# Patient Record
Sex: MALE | Race: WHITE | NOT HISPANIC OR LATINO
[De-identification: names, ages, dates, MRNs, and addresses within clinical notes are randomized per-mention and may not be internally consistent; named-entity substitution may affect disease eponyms.]

---

## 2018-03-16 ENCOUNTER — RESULT REVIEW (OUTPATIENT)
Age: 53
End: 2018-03-16

## 2018-11-07 PROBLEM — Z00.00 ENCOUNTER FOR PREVENTIVE HEALTH EXAMINATION: Status: ACTIVE | Noted: 2018-11-07

## 2018-11-08 ENCOUNTER — APPOINTMENT (OUTPATIENT)
Dept: CT IMAGING | Facility: CLINIC | Age: 53
End: 2018-11-08
Payer: COMMERCIAL

## 2018-11-08 ENCOUNTER — OUTPATIENT (OUTPATIENT)
Dept: OUTPATIENT SERVICES | Facility: HOSPITAL | Age: 53
LOS: 1 days | End: 2018-11-08
Payer: COMMERCIAL

## 2018-11-08 DIAGNOSIS — Z00.8 ENCOUNTER FOR OTHER GENERAL EXAMINATION: ICD-10-CM

## 2018-11-08 PROCEDURE — 70486 CT MAXILLOFACIAL W/O DYE: CPT

## 2018-11-08 PROCEDURE — 70486 CT MAXILLOFACIAL W/O DYE: CPT | Mod: 26

## 2020-01-27 ENCOUNTER — NON-APPOINTMENT (OUTPATIENT)
Age: 55
End: 2020-01-27

## 2020-01-27 ENCOUNTER — APPOINTMENT (OUTPATIENT)
Dept: PULMONOLOGY | Facility: CLINIC | Age: 55
End: 2020-01-27

## 2020-01-27 ENCOUNTER — APPOINTMENT (OUTPATIENT)
Dept: PULMONOLOGY | Facility: CLINIC | Age: 55
End: 2020-01-27
Payer: COMMERCIAL

## 2020-01-27 VITALS
BODY MASS INDEX: 28.49 KG/M2 | RESPIRATION RATE: 16 BRPM | SYSTOLIC BLOOD PRESSURE: 120 MMHG | OXYGEN SATURATION: 96 % | WEIGHT: 188 LBS | HEIGHT: 68 IN | DIASTOLIC BLOOD PRESSURE: 80 MMHG | HEART RATE: 83 BPM

## 2020-01-27 DIAGNOSIS — J82 PULMONARY EOSINOPHILIA, NOT ELSEWHERE CLASSIFIED: ICD-10-CM

## 2020-01-27 DIAGNOSIS — Z78.9 OTHER SPECIFIED HEALTH STATUS: ICD-10-CM

## 2020-01-27 DIAGNOSIS — Z86.19 PERSONAL HISTORY OF OTHER INFECTIOUS AND PARASITIC DISEASES: ICD-10-CM

## 2020-01-27 PROCEDURE — 95012 NITRIC OXIDE EXP GAS DETER: CPT

## 2020-01-27 PROCEDURE — 94618 PULMONARY STRESS TESTING: CPT

## 2020-01-27 PROCEDURE — 99204 OFFICE O/P NEW MOD 45 MIN: CPT | Mod: 25

## 2020-01-27 PROCEDURE — 71046 X-RAY EXAM CHEST 2 VIEWS: CPT

## 2020-01-27 PROCEDURE — 94060 EVALUATION OF WHEEZING: CPT

## 2020-01-27 RX ORDER — BUDESONIDE 0.5 MG/2ML
0.5 INHALANT ORAL
Qty: 60 | Refills: 5 | Status: ACTIVE | COMMUNITY
Start: 2020-01-27 | End: 1900-01-01

## 2020-01-27 RX ORDER — ALBUTEROL SULFATE 90 UG/1
108 (90 BASE) AEROSOL, METERED RESPIRATORY (INHALATION)
Refills: 0 | Status: ACTIVE | COMMUNITY

## 2020-01-27 RX ORDER — CICLESONIDE 160 UG/1
160 AEROSOL, METERED RESPIRATORY (INHALATION) TWICE DAILY
Qty: 3 | Refills: 1 | Status: ACTIVE | COMMUNITY
Start: 2020-01-27 | End: 1900-01-01

## 2020-01-27 NOTE — PHYSICAL EXAM
[No Acute Distress] : no acute distress [Normal Oropharynx] : normal oropharynx [III] : Mallampati Class: III [Normal Appearance] : normal appearance [No Neck Mass] : no neck mass [Normal Rate/Rhythm] : normal rate/rhythm [Normal S1, S2] : normal s1, s2 [No Murmurs] : no murmurs [No Resp Distress] : no resp distress [Clear to Auscultation Bilaterally] : clear to auscultation bilaterally [No Abnormalities] : no abnormalities [Benign] : benign [Normal Gait] : normal gait [No Clubbing] : no clubbing [No Cyanosis] : no cyanosis [No Edema] : no edema [FROM] : FROM [Normal Color/ Pigmentation] : normal color/ pigmentation [No Focal Deficits] : no focal deficits [Oriented x3] : oriented x3 [Normal Affect] : normal affect [TextBox_68] : I:E 1:3, expiratory wheezes

## 2020-01-27 NOTE — ADDENDUM
[FreeTextEntry1] : Documented by Zan Begum acting as a scribe for Dr. Arpit Lindquist on 01/27/2020 \par \par All medical record entries made by the Scribe were at my, Dr. Arpit Lindquist's, direction and personally dictated by me on 01/27/2020 . I have reviewed the chart and agree that the record accurately reflects my personal performance of the history, physical exam, assessment and plan. I have also personally directed, reviewed, and agree with the discharge instructions.

## 2020-01-27 NOTE — HISTORY OF PRESENT ILLNESS
[TextBox_4] : Mr. LASHAUN ABERNATHY is a 54 year year old male coming into the office for an initial evaluation. His chief complaint is\par -Every few months, he gets a sinus infection and needs the use of prednisone. \par -he states that he gets excess mucus production, sinus headache.\par -he states that the prednisone clears himself. \par -he reports that he had chest tightness. \par -he states that he gets a aluminum or steel smell/taste. \par -he notes that he used to have heartburn but after losing weight its resolved. \par -he notes that he gets a lump in his throat that needs to be clears. \par -he states that he has a PND. \par -he states that he snores. \par -he wakes up rested after a night of sleep. \par -he notes being fatigue at times. \par -He reports being unable to fall asleep while watching a boring TV show. \par -he notes that his memory and concentration is good. \par -he states that his neck size is 16. \par -He denies any headaches, nausea, vomiting, fever, chills, sweats, chest pain, chest pressure, palpitations, SOB, coughing, wheezing, fatigue, diarrhea, constipation, dysphagia, myalgias, dizziness, leg swelling, leg pain, itchy eyes, itchy ears, heartburn, reflux, or sour taste in the mouth.

## 2020-01-27 NOTE — PROCEDURE
[FreeTextEntry1] : FENO was 154; a normal value being less than 25\par Fractional exhaled nitric oxide (FENO) is regarded as a simple, noninvasive method for assessing eosinophilic airway inflammation. Produced by a variety of cells within the lung, nitric oxide (NO) concentrations are generally low in healthy individuals. However, high concentrations of NO appear to be involved in nonspecific host defense mechanisms and chronic inflammatory diseases such as asthma. The American Thoracic Society (ATS) therefore has recommended using FENO to aid in the diagnosis and monitoring of eosinophilic airway inflammation and asthma, and for identifying steroid responsive individuals whose chronic respiratory symptoms may be caused by airway inflammation. \par \par PFT reveals normal flows, with an FEV1 of  2.99L, which is 83% of predicted, with a normal flow volume loop with a 11% improvement with bronchodilator\par \par CXR revealed a normal sized heart; there was no evidence of infiltrate or effusion-- A normal chest radiograph. \par \par 6 minute walk test reveals a low saturation of 95% with somewhat dyspnea; walked 652.0 meters

## 2020-01-27 NOTE — ASSESSMENT
[FreeTextEntry1] : Mr. LASHAUN ABERNATHY is a 54 year year old male who has a history of chicken pox, chronic sinus disease, sarcoidoses, Cornelio's syndrome, Asthma, GERD, Nasal and sinus Polyps, who now comes in for pulmonary evaluation. \par \par The patient's SOB is felt to be multifactorial:\par -pulmonary \par    -Asthma ; less likely from sarcoidosis (inactive)  \par - Poor breathing mechanics\par - Overweight/ Out-of Shape\par - Restrictive Dysfunction or Obstructive Dysfunction\par - ?Cardiac Disease \par \par Problem 1:Asthma \par -Candidate of Biological therapy  \par prior IGE levels, had been on Xolair \par -Complete blood work for asthma profile, food IgE panel, eosinophil level, IgE level, Vitamin D level after 2-3 weeks of finishing prednisone course \par -Add Bevespi 2 inhalations BID\par -Add Alvesco (160) 2 inhalations BID \par -Add Zyflo 400 mg BID\par -Add Albuterol QID via Nebulizer \par -Asthma is believed to be caused by inherited (genetic) and environmental factor, but its exact cause is unknown. Asthma may be triggered by allergens, lung infections, or irritants in the air. Asthma triggers are different for each person \par  \par Problem 2: Sarcoidosis\par -currently inactive  \par -at this moment, no need for any therapy \par \par Problem 3:GERD \par - Things to avoid including overeating, spicy foods, tight clothing, eating within three hours of bed, this list is not all inclusive.\par \par - For treatment of reflux, possible options discussed including diet control, H2 blockers, PPIs, as well as coating motility agents discussed as treatment options. Timing of meals and proximity of last meal to sleep were discussed. If symptoms persist, a formal gastrointestinal evaluation is needed. \par \par Problem 4:Chronic Sinus Disease/ Nasal Polyps \par -Recommended the use of Navage Rinsing system. Pulmicort .5  in the Navage \par -considering Dupixent \par \par Problem 5:NAIDA\par -complete home sleep study \par - Due to his / her EDS, fatigue, snoring, elevated Mallampati class, poor memory, poor concentration, and neck size, he / she is being recommended for a home sleep study. \par - Sleep apnea is associated with adverse clinical consequences which an affect most organ systems. Cardiovascular disease risk includes arrhythmias, atrial fibrillation, hypertension, coronary artery disease, and stroke. Metabolic disorders include diabetes type 2, non-alcoholic fatty liver disease. Mood disorder especially depression; and cognitive decline especially in the elderly. Associations with chronic reflux/Akhtar’s esophagus some but not all inclusive. \par -Reasons include arousal consistent with hypopnea; respiratory events most prominent in REM sleep or supine position; therefore sleep staging and body position are important for accurate diagnosis and estimation of AHI. \par \par Problem 6: Overweight\par - Weight loss, exercise, and diet control were discussed and are highly encouraged. Treatment options were given such as, aqua therapy, and contacting a nutritionist. Recommended to use the elliptical, stationary bike, less use of treadmill. Mindful eating was explained to the patient Obesity is associated with worsening asthma, shortness of breath, and potential for cardiac disease, diabetes, and other underlying medical conditions.\par \par Problem 7: Poor Breathing Mechanics\par - Proper breathing techniques were reviewed with an emphasis of exhalation. Patient instructed to breath in for 1 second and out for four seconds. Patient was encouraged to not talk while walking.\par \par Problem 8: Health maintenance\par -s/p flu shot\par -recommended strep pneumonia vaccines: Prevnar-13 vaccine, followed by Pneumo vaccine 23 one year following\par -recommended early intervention for URIs\par -recommended regular osteoporosis evaluations\par -recommended early dermatological evaluations\par -recommended after the age of 50 to the age of 70, colonoscopy every 5 years\par \par f/u in 6-8 weeks\par pt is encouraged to call or fax the office with any questions or concerns.\par Explained to the pt in full detail with demonstrations how to use the inhalers and inhaler hygiene.\par -Education provided to the PT regarding their visit and conditions.

## 2020-03-05 ENCOUNTER — LABORATORY RESULT (OUTPATIENT)
Age: 55
End: 2020-03-05

## 2020-03-05 LAB
25(OH)D3 SERPL-MCNC: 25.4 NG/ML
BASOPHILS # BLD AUTO: 0.04 K/UL
BASOPHILS NFR BLD AUTO: 0.7 %
EOSINOPHIL # BLD AUTO: 0.66 K/UL
EOSINOPHIL NFR BLD AUTO: 10.8 %
HCT VFR BLD CALC: 47.3 %
HGB BLD-MCNC: 14.8 G/DL
IMM GRANULOCYTES NFR BLD AUTO: 0.2 %
LYMPHOCYTES # BLD AUTO: 1.83 K/UL
LYMPHOCYTES NFR BLD AUTO: 30 %
MAN DIFF?: NORMAL
MCHC RBC-ENTMCNC: 26 PG
MCHC RBC-ENTMCNC: 31.3 GM/DL
MCV RBC AUTO: 83.1 FL
MONOCYTES # BLD AUTO: 0.56 K/UL
MONOCYTES NFR BLD AUTO: 9.2 %
NEUTROPHILS # BLD AUTO: 3.01 K/UL
NEUTROPHILS NFR BLD AUTO: 49.1 %
PLATELET # BLD AUTO: 256 K/UL
RBC # BLD: 5.69 M/UL
RBC # FLD: 14.9 %
WBC # FLD AUTO: 6.11 K/UL

## 2020-03-06 LAB — 24R-OH-CALCIDIOL SERPL-MCNC: 38.1 PG/ML

## 2020-03-08 LAB — TOTAL IGE SMQN RAST: 125 KU/L

## 2020-03-10 ENCOUNTER — APPOINTMENT (OUTPATIENT)
Dept: PULMONOLOGY | Facility: CLINIC | Age: 55
End: 2020-03-10
Payer: COMMERCIAL

## 2020-03-10 VITALS
HEART RATE: 85 BPM | DIASTOLIC BLOOD PRESSURE: 78 MMHG | HEIGHT: 68 IN | BODY MASS INDEX: 28.49 KG/M2 | RESPIRATION RATE: 17 BRPM | SYSTOLIC BLOOD PRESSURE: 110 MMHG | OXYGEN SATURATION: 95 % | WEIGHT: 188 LBS

## 2020-03-10 LAB
A ALTERNATA IGE QN: <0.1 KUA/L
A FUMIGATUS IGE QN: <0.1 KUA/L
C ALBICANS IGE QN: <0.1 KUA/L
C HERBARUM IGE QN: <0.1 KUA/L
CAT DANDER IGE QN: <0.1 KUA/L
CLAM IGE QN: <0.1 KUA/L
CODFISH IGE QN: <0.1 KUA/L
COMMON RAGWEED IGE QN: 0.12 KUA/L
CORN IGE QN: <0.1 KUA/L
COW MILK IGE QN: 0.15 KUA/L
D FARINAE IGE QN: <0.1 KUA/L
D PTERONYSS IGE QN: <0.1 KUA/L
DEPRECATED A ALTERNATA IGE RAST QL: 0
DEPRECATED A FUMIGATUS IGE RAST QL: 0
DEPRECATED C ALBICANS IGE RAST QL: 0
DEPRECATED C HERBARUM IGE RAST QL: 0
DEPRECATED CAT DANDER IGE RAST QL: 0
DEPRECATED CLAM IGE RAST QL: 0
DEPRECATED CODFISH IGE RAST QL: 0
DEPRECATED COMMON RAGWEED IGE RAST QL: NORMAL
DEPRECATED CORN IGE RAST QL: 0
DEPRECATED COW MILK IGE RAST QL: NORMAL
DEPRECATED D FARINAE IGE RAST QL: 0
DEPRECATED D PTERONYSS IGE RAST QL: 0
DEPRECATED DOG DANDER IGE RAST QL: 0
DEPRECATED EGG WHITE IGE RAST QL: NORMAL
DEPRECATED M RACEMOSUS IGE RAST QL: 0
DEPRECATED PEANUT IGE RAST QL: NORMAL
DEPRECATED ROACH IGE RAST QL: 0
DEPRECATED SCALLOP IGE RAST QL: <0.1 KUA/L
DEPRECATED SESAME SEED IGE RAST QL: NORMAL
DEPRECATED SHRIMP IGE RAST QL: 0
DEPRECATED SOYBEAN IGE RAST QL: 0
DEPRECATED TIMOTHY IGE RAST QL: 0
DEPRECATED WALNUT IGE RAST QL: 0
DEPRECATED WHEAT IGE RAST QL: NORMAL
DEPRECATED WHITE OAK IGE RAST QL: 0
DOG DANDER IGE QN: <0.1 KUA/L
EGG WHITE IGE QN: 0.33 KUA/L
M RACEMOSUS IGE QN: <0.1 KUA/L
PEANUT IGE QN: 0.12 KUA/L
ROACH IGE QN: <0.1 KUA/L
SCALLOP IGE QN: 0
SCALLOP IGE QN: <0.1 KUA/L
SESAME SEED IGE QN: 0.12 KUA/L
SOYBEAN IGE QN: <0.1 KUA/L
TIMOTHY IGE QN: <0.1 KUA/L
WALNUT IGE QN: <0.1 KUA/L
WHEAT IGE QN: 0.32 KUA/L
WHITE OAK IGE QN: <0.1 KUA/L

## 2020-03-10 PROCEDURE — 99214 OFFICE O/P EST MOD 30 MIN: CPT | Mod: 25

## 2020-03-10 NOTE — ASSESSMENT
[FreeTextEntry1] : Mr. LASHAUN ABERNATHY is a 54 year year old male who has a history of chicken pox, chronic sinus disease, sarcoidoses, Cornelio's syndrome, severe persistent Asthma, GERD, Nasal and sinus Polyps, who now comes in for pulmonary evaluation. \par \par The patient's SOB is felt to be multifactorial:\par -pulmonary \par    -Asthma ; less likely from sarcoidosis (inactive)  \par - Poor breathing mechanics\par - Overweight/ Out-of Shape\par - Restrictive Dysfunction or Obstructive Dysfunction\par - ?Cardiac Disease \par \par Problem 1: Severe Persistent Asthma - poorly controlled \par -Candidate of Biological therapy  \par prior IGE levels, had been on Xolair \par - s/p blood work for asthma profile (+), food IgE panel (+), eosinophil level (+), IgE level (+), Vitamin D level (low) \par -continue Bevespi 2 inhalations BID\par -continue Alvesco (160) 2 inhalations BID \par -Add QVar 2 inhalations BID\par -continue Zyflo 600 mg BID\par -continue Albuterol QID via Nebulizer \par -Asthma is believed to be caused by inherited (genetic) and environmental factor, but its exact cause is unknown. Asthma may be triggered by allergens, lung infections, or irritants in the air. Asthma triggers are different for each person \par  \par Problem 1A: Eosinophilic  Asthma \par - Fasenra, Nucala, Dupixent Candidate \par Dupixent is a prescription medicine used with other asthma medicines for the maintenance treatment of moderate-to-severe asthma in people aged 12 years and older whose asthma is not controlled with their current asthma medicines. Dupixent helps prevent severe asthma attacks (exacerbations) and can improve your breathing. Dupixent may also help reduce the amount of oral corticosteroids you need while preventing severe asthma attacks and improving your breathing. Dupixent is not used to treat sudden breathing problems. Risks and side effect of Dupixent were discussed and reviewed with patient.\par \par \par Problem 1B: elevated IgE (125)\par - Candidate for Xolair \par -Xolair is a recombinant DNA- derived humanized IgG1K monoclonal antibody that selectively binds to human immunoglobulin E (IgE). Xolair is produced by a Chinese hamster ovary cell suspension culture in nutrient medium containing the antibiotic gentamicin. Gentamicin is not detectable in the final product. Xolair is a sterile, white, preservative free, lyophilized powder contained in a single use vial that is reconstituted with sterile water for suspension. Side effects include: wheezing, tightness of the chest, trouble breathing, hives, skin rash, feeling anxious or light-headed, fainting, warmth or tingling under skin, or swelling of face, lips, or tongue \par \par Problem 2: Sarcoidosis\par -currently inactive  \par -at this moment, no need for any therapy \par -Sarcoidosis is a medical mystery and can present with very serious illness or little consequence. The disease can affect any organ including skin, liver, lymph glands, spleen, eyes, nervous system, musculoskeletal system, heart, brain, kidneys, and including the lungs. Pulmonary sarcoidosis can cause loss of lung volume and abnormal lung stiffness. This disease is characterized by presence of granulomas, small areas of inflamed cells. Sarcoidosis patients should have regular cardiac and eye examinations as well as regular PFTs. \par \par Problem 2A: Low Vit D\par - On Vit D supplements \par Has been associated with asthma exacerbations and increased allergic symptoms. The goal based on recent information is maintaining levels between 50-70 and low normal is 30. Recommended 50,000 units every two weeks to once a month depending on the level.\par \par \par Problem 3:GERD \par - Things to avoid including overeating, spicy foods, tight clothing, eating within three hours of bed, this list is not all inclusive.\par \par - For treatment of reflux, possible options discussed including diet control, H2 blockers, PPIs, as well as coating motility agents discussed as treatment options. Timing of meals and proximity of last meal to sleep were discussed. If symptoms persist, a formal gastrointestinal evaluation is needed. \par \par Problem 4:Chronic Sinus Disease/ Nasal Polyps \par \par -Recommended the use of Navage Rinsing system. Pulmicort .5  in the Navage \par -Dupixent 200 q2wh\par \par Problem 5:NAIDA\par -complete home sleep study \par - Due to his / her EDS, fatigue, snoring, elevated Mallampati class, poor memory, poor concentration, and neck size, he / she is being recommended for a home sleep study. \par - Sleep apnea is associated with adverse clinical consequences which an affect most organ systems. Cardiovascular disease risk includes arrhythmias, atrial fibrillation, hypertension, coronary artery disease, and stroke. Metabolic disorders include diabetes type 2, non-alcoholic fatty liver disease. Mood disorder especially depression; and cognitive decline especially in the elderly. Associations with chronic reflux/Akhtar’s esophagus some but not all inclusive. \par -Reasons include arousal consistent with hypopnea; respiratory events most prominent in REM sleep or supine position; therefore sleep staging and body position are important for accurate diagnosis and estimation of AHI. \par \par Problem 6: Overweight\par - Weight loss, exercise, and diet control were discussed and are highly encouraged. Treatment options were given such as, aqua therapy, and contacting a nutritionist. Recommended to use the elliptical, stationary bike, less use of treadmill. Mindful eating was explained to the patient Obesity is associated with worsening asthma, shortness of breath, and potential for cardiac disease, diabetes, and other underlying medical conditions.\par \par Problem 7: Poor Breathing Mechanics\par - Proper breathing techniques were reviewed with an emphasis of exhalation. Patient instructed to breath in for 1 second and out for four seconds. Patient was encouraged to not talk while walking.\par \par Problem 8: Health maintenance\par -s/p flu shot\par -recommended strep pneumonia vaccines: Prevnar-13 vaccine, followed by Pneumo vaccine 23 one year following\par -recommended early intervention for URIs\par -recommended regular osteoporosis evaluations\par -recommended early dermatological evaluations\par -recommended after the age of 50 to the age of 70, colonoscopy every 5 years\par \par f/u in 6-8 weeks\par pt is encouraged to call or fax the office with any questions or concerns.\par Explained to the pt in full detail with demonstrations how to use the inhalers and inhaler hygiene.\par -Education provided to the PT regarding their visit and conditions.

## 2020-03-10 NOTE — HISTORY OF PRESENT ILLNESS
[TextBox_4] : Mr. LASHAUN ABERNATHY is a 54 year year old male with hx of severe persistent asthma, sarcoidosis, nasal polyposis, allergic rhinitis, coming into the office for an initial evaluation. His chief complaint is\par - he notes he has not been feeling well for about 10 days. \par - He notes he has been losing weight \par - he notes hes been bringing up clear phlegm \par - His energy levels have been about 7/10 \par - he has been having trouble with sleep, hes been coughing and its been worse when he lays down\par - He has been using his inhalers properly \par - he notes he has been having some chest pressure \par - he notes his sinuses have been bothering him. \par -he denies any headaches, nausea, vomiting, fever, chills, sweats, chest pain,, diarrhea, constipation, dysphagia, dizziness, sour taste in the mouth, leg swelling, leg pain, itchy eyes, itchy ears, heartburn, reflux, myalgias or arthralgias.

## 2020-03-10 NOTE — PHYSICAL EXAM
[No Acute Distress] : no acute distress [Normal Oropharynx] : normal oropharynx [III] : Mallampati Class: III [Normal Appearance] : normal appearance [No Neck Mass] : no neck mass [Normal Rate/Rhythm] : normal rate/rhythm [Normal S1, S2] : normal s1, s2 [No Murmurs] : no murmurs [No Resp Distress] : no resp distress [Clear to Auscultation Bilaterally] : clear to auscultation bilaterally [No Abnormalities] : no abnormalities [Benign] : benign [Normal Gait] : normal gait [No Clubbing] : no clubbing [No Cyanosis] : no cyanosis [No Edema] : no edema [FROM] : FROM [Normal Color/ Pigmentation] : normal color/ pigmentation [No Focal Deficits] : no focal deficits [Oriented x3] : oriented x3 [Normal Affect] : normal affect [TextBox_68] : I:E 1:3, expiratory wheezes b/l

## 2020-03-10 NOTE — REASON FOR VISIT
[Initial] : an initial visit [TextBox_44] : severe persistent asthma, sarcoidosis, nasal polyposis, allergic rhinitis

## 2020-03-10 NOTE — ADDENDUM
[FreeTextEntry1] : Documented by Carissa Wilson acting as a scribe for Dr. Arpit Lindquist on 03/10/2020.\par \par All medical record entries made by the Scribe were at my, Dr. Arpit Lindquist's, direction and personally dictated by me on 03/10/2020. I have reviewed the chart and agree that the record accurately reflects my personal performance of the history, physical exam, assessment and plan. I have also personally directed, reviewed, and agree with the discharge instructions.\par

## 2020-06-14 ENCOUNTER — RX RENEWAL (OUTPATIENT)
Age: 55
End: 2020-06-14

## 2020-06-22 ENCOUNTER — APPOINTMENT (OUTPATIENT)
Dept: PULMONOLOGY | Facility: CLINIC | Age: 55
End: 2020-06-22
Payer: COMMERCIAL

## 2020-06-22 VITALS
DIASTOLIC BLOOD PRESSURE: 80 MMHG | TEMPERATURE: 97.2 F | HEIGHT: 68 IN | HEART RATE: 73 BPM | BODY MASS INDEX: 28.49 KG/M2 | RESPIRATION RATE: 17 BRPM | WEIGHT: 188 LBS | SYSTOLIC BLOOD PRESSURE: 120 MMHG | OXYGEN SATURATION: 97 %

## 2020-06-22 PROCEDURE — 99214 OFFICE O/P EST MOD 30 MIN: CPT

## 2020-06-22 NOTE — HISTORY OF PRESENT ILLNESS
[TextBox_4] : Mr. LASHAUN ABERNATYH is a 54 year year old male with hx of severe persistent asthma, sarcoidosis, nasal polyposis, allergic rhinitis, coming into the office for a f/u evaluation. His chief complaint is health maintenance. \par -he states that he has loss some weight recently. \par -he states that he has been using the Elliptical. \par -has been doing well. \par -has been exercising. \par -heartburn and reflux is quiet since the change in diet. \par -There are no visual issues. \par -reports some old knee injury causing some problem. \par -He reports that He is taking their prescribed medications regularly. \par -he states that he has been on Dupixent for 3 months. \par -he notes that His bowels are regular. \par \par -He denies any chest pain, chest pressure, diarrhea, constipation, dysphagia, dizziness, sour taste in the mouth, leg swelling, leg pain, itchy eyes, itchy ears, heartburn, reflux.

## 2020-06-22 NOTE — ADDENDUM
[FreeTextEntry1] : Documented by Zan Begum acting as a scribe for Dr. Arpit Lindquist on 06/22/2020   All medical record entries made by the Scribe were at my, Dr. Arpit Lindquist's, direction and personally dictated by me on 06/22/2020 . I have reviewed the chart and agree that the record accurately reflects my personal performance of the history, physical exam, assessment and plan. I have also personally directed, reviewed, and agree with the discharge instructions.

## 2020-06-22 NOTE — REASON FOR VISIT
[Follow-Up] : a follow-up visit [TextBox_44] : severe persistent asthma, sarcoidosis, nasal polyposis, allergic rhinitis

## 2020-06-22 NOTE — PHYSICAL EXAM
[Normal Oropharynx] : normal oropharynx [III] : Mallampati Class: III [No Acute Distress] : no acute distress [No Neck Mass] : no neck mass [Normal Appearance] : normal appearance [Normal Rate/Rhythm] : normal rate/rhythm [Normal S1, S2] : normal s1, s2 [No Resp Distress] : no resp distress [Clear to Auscultation Bilaterally] : clear to auscultation bilaterally [No Murmurs] : no murmurs [No Abnormalities] : no abnormalities [Benign] : benign [Normal Gait] : normal gait [No Cyanosis] : no cyanosis [No Clubbing] : no clubbing [FROM] : FROM [No Edema] : no edema [Oriented x3] : oriented x3 [No Focal Deficits] : no focal deficits [Normal Color/ Pigmentation] : normal color/ pigmentation [Normal Affect] : normal affect [TextBox_68] : I:E 1:3, clear

## 2020-06-22 NOTE — ASSESSMENT
[FreeTextEntry1] : Mr. LASHAUN ABERNATHY is a 54 year old male who has a history of chicken pox, chronic sinus disease, sarcoidoses, Cornelio's syndrome, severe persistent Asthma, GERD, Nasal and sinus Polyps, who now comes in for pulmonary evaluation. - now controlled on Dupixent for 3 months. \par \par The patient's SOB is felt to be multifactorial:\par -pulmonary \par    -Asthma ; less likely from sarcoidosis (inactive)  \par - Poor breathing mechanics\par - Overweight/ Out-of Shape\par - Restrictive Dysfunction or Obstructive Dysfunction\par - ?Cardiac Disease \par \par Problem 1: Severe Persistent Asthma - poorly controlled (improved) \par -Candidate of Biological therapy  \par prior IGE levels, had been on Xolair \par - s/p blood work for asthma profile (+), food IgE panel (+), eosinophil level (+), IgE level (+), Vitamin D level (low) \par -continue Bevespi 2 inhalations BID\par -continue Alvesco (160) 2 inhalations BID \par -Add QVar 2 inhalations BID\par -continue Zyflo 600 mg BID\par -continue Albuterol QID via Nebulizer \par -Asthma is believed to be caused by inherited (genetic) and environmental factor, but its exact cause is unknown. Asthma may be triggered by allergens, lung infections, or irritants in the air. Asthma triggers are different for each person \par  \par Problem 1A: Eosinophilic  Asthma - stable \par - Fasenra, Nucala, Dupixent Candidate - but on Dupixent x 3 months. \par Dupixent is a prescription medicine used with other asthma medicines for the maintenance treatment of moderate-to-severe asthma in people aged 12 years and older whose asthma is not controlled with their current asthma medicines. Dupixent helps prevent severe asthma attacks (exacerbations) and can improve your breathing. Dupixent may also help reduce the amount of oral corticosteroids you need while preventing severe asthma attacks and improving your breathing. Dupixent is not used to treat sudden breathing problems. Risks and side effect of Dupixent were discussed and reviewed with patient.\par \par \par Problem 1B: elevated IgE (125)\par - Candidate for Xolair \par -Xolair is a recombinant DNA- derived humanized IgG1K monoclonal antibody that selectively binds to human immunoglobulin E (IgE). Xolair is produced by a Chinese hamster ovary cell suspension culture in nutrient medium containing the antibiotic gentamicin. Gentamicin is not detectable in the final product. Xolair is a sterile, white, preservative free, lyophilized powder contained in a single use vial that is reconstituted with sterile water for suspension. Side effects include: wheezing, tightness of the chest, trouble breathing, hives, skin rash, feeling anxious or light-headed, fainting, warmth or tingling under skin, or swelling of face, lips, or tongue \par \par Problem 2: Sarcoidosis\par -currently inactive  \par -at this moment, no need for any therapy \par -Sarcoidosis is a medical mystery and can present with very serious illness or little consequence. The disease can affect any organ including skin, liver, lymph glands, spleen, eyes, nervous system, musculoskeletal system, heart, brain, kidneys, and including the lungs. Pulmonary sarcoidosis can cause loss of lung volume and abnormal lung stiffness. This disease is characterized by presence of granulomas, small areas of inflamed cells. Sarcoidosis patients should have regular cardiac and eye examinations as well as regular PFTs. \par \par Problem 2A: Low Vit D\par - On Vit D supplements \par Has been associated with asthma exacerbations and increased allergic symptoms. The goal based on recent information is maintaining levels between 50-70 and low normal is 30. Recommended 50,000 units every two weeks to once a month depending on the level.\par \par \par Problem 3:GERD \par - Things to avoid including overeating, spicy foods, tight clothing, eating within three hours of bed, this list is not all inclusive.\par \par - For treatment of reflux, possible options discussed including diet control, H2 blockers, PPIs, as well as coating motility agents discussed as treatment options. Timing of meals and proximity of last meal to sleep were discussed. If symptoms persist, a formal gastrointestinal evaluation is needed. \par \par Problem 4:Chronic Sinus Disease/ Nasal Polyps -improved \par -Recommended the use of Navage Rinsing system. Pulmicort .5  in the Navage \par -Dupixent 200 q2wh\par \par Problem 5: ?NAIDA\par -complete home sleep study \par - Due to his / her EDS, fatigue, snoring, elevated Mallampati class, poor memory, poor concentration, and neck size, he / she is being recommended for a home sleep study. \par - Sleep apnea is associated with adverse clinical consequences which an affect most organ systems. Cardiovascular disease risk includes arrhythmias, atrial fibrillation, hypertension, coronary artery disease, and stroke. Metabolic disorders include diabetes type 2, non-alcoholic fatty liver disease. Mood disorder especially depression; and cognitive decline especially in the elderly. Associations with chronic reflux/Akhtar’s esophagus some but not all inclusive. \par -Reasons include arousal consistent with hypopnea; respiratory events most prominent in REM sleep or supine position; therefore sleep staging and body position are important for accurate diagnosis and estimation of AHI. \par \par Problem 6: Overweight \par - Weight loss, exercise, and diet control were discussed and are highly encouraged. Treatment options were given such as, aqua therapy, and contacting a nutritionist. Recommended to use the elliptical, stationary bike, less use of treadmill. Mindful eating was explained to the patient Obesity is associated with worsening asthma, shortness of breath, and potential for cardiac disease, diabetes, and other underlying medical conditions.\par \par Problem 7: Poor Breathing Mechanics\par - Proper breathing techniques were reviewed with an emphasis of exhalation. Patient instructed to breath in for 1 second and out for four seconds. Patient was encouraged to not talk while walking.\par \par Problem 8: Health Maintenance/COVID19 Precautions:\par - Clean your hands often. Wash your hands often with soap and water for at least 20 seconds, especially after blowing your nose, coughing, or sneezing, or having been in a public place.\par - If soap and water are not available, use a hand  that contains at least 60% alcohol.\par - To the extent possible, avoid touching high-touch surfaces in public places - elevator buttons, door handles, handrails, handshaking with people, etc. Use a tissue or your sleeve to cover your hand or finger if you must touch something.\par - Wash your hands after touching surfaces in public places.\par - Avoid touching your face, nose, eyes, etc.\par - Clean and disinfect your home to remove germs: practice routine cleaning of frequently touched surfaces (for example: tables, doorknobs, light switches, handles, desks, toilets, faucets, sinks & cell phones)\par - Avoid crowds, especially in poorly ventilated spaces. Your risk of exposure to respiratory viruses like COVID-19 may increase in crowded, closed-in settings with little air circulation if\par there are people in the crowd who are sick. All patients are recommended to practice social distancing and stay at least 6 feet away from others.\par - Avoid all non-essential travel including plane trips, and especially avoid embarking on cruise ships.\par -If COVID-19 is spreading in your community, take extra measures to put distance between yourself and other people to further reduce your risk of being exposed to this new virus.\par -Stay home as much as possible.\par - Consider ways of getting food brought to your house through family, social, or commercial networks\par -Be aware that the virus has been known to live in the air up to 3 hours post exposure, cardboard up to 24 hours post exposure, copper up to 4 hours post exposure, steel and plastic up to 2-3 days post exposure. Risk of transmission from these surfaces are affected by many variables.\par \par Immune Support Recommendations:\par -OTC Vitamin C 500mg BID \par -OTC Quercetin 250-500mg BID \par -OTC Zinc 75-100mg per day \par -OTC Melatonin 1or 2mg a night \par -OTC Vitamin D 1-4000mg per day\par -Tonic Water 8oz\par -Recommended to Stay Hydrated (At least a gallon/day)\par \par Asthma and COVID19:\par You need to make sure your asthma is under control. This often requires the use of inhaled corticosteroids (and sometimes oral corticosteroids). Inhaled corticosteroids do not likely reduce your immune system’s ability to fight infections, but oral corticosteroids may. It is important to use the steps above to protect yourself to limit your exposure to any respiratory virus. \par \par Problem 9: Health maintenance\par -s/p flu shot\par -recommended strep pneumonia vaccines: Prevnar-13 vaccine, followed by Pneumo vaccine 23 one year following\par -recommended early intervention for URIs\par -recommended regular osteoporosis evaluations\par -recommended early dermatological evaluations\par -recommended after the age of 50 to the age of 70, colonoscopy every 5 years\par \par f/u in 3 months. [facility]. \par pt is encouraged to call or fax the office with any questions or concerns.\par Explained to the pt in full detail with demonstrations how to use the inhalers and inhaler hygiene.\par -Education provided to the PT regarding their visit and conditions.

## 2020-09-08 DIAGNOSIS — Z01.812 ENCOUNTER FOR PREPROCEDURAL LABORATORY EXAMINATION: ICD-10-CM

## 2020-09-24 ENCOUNTER — TRANSCRIPTION ENCOUNTER (OUTPATIENT)
Age: 55
End: 2020-09-24

## 2020-09-30 ENCOUNTER — APPOINTMENT (OUTPATIENT)
Dept: PULMONOLOGY | Facility: CLINIC | Age: 55
End: 2020-09-30
Payer: COMMERCIAL

## 2020-09-30 VITALS
TEMPERATURE: 98 F | BODY MASS INDEX: 28.79 KG/M2 | HEART RATE: 82 BPM | HEIGHT: 68 IN | OXYGEN SATURATION: 97 % | SYSTOLIC BLOOD PRESSURE: 120 MMHG | DIASTOLIC BLOOD PRESSURE: 80 MMHG | RESPIRATION RATE: 17 BRPM | WEIGHT: 190 LBS

## 2020-09-30 DIAGNOSIS — Z23 ENCOUNTER FOR IMMUNIZATION: ICD-10-CM

## 2020-09-30 PROCEDURE — G0008: CPT

## 2020-09-30 PROCEDURE — 94010 BREATHING CAPACITY TEST: CPT

## 2020-09-30 PROCEDURE — 90682 RIV4 VACC RECOMBINANT DNA IM: CPT

## 2020-09-30 PROCEDURE — 94729 DIFFUSING CAPACITY: CPT

## 2020-09-30 PROCEDURE — 94727 GAS DIL/WSHOT DETER LNG VOL: CPT

## 2020-09-30 PROCEDURE — 99214 OFFICE O/P EST MOD 30 MIN: CPT | Mod: 25

## 2020-09-30 NOTE — PROCEDURE
[FreeTextEntry1] : FULL PFTs reveals normal flows; FEV1 was 3.73L which is   99% of predicted; normal lung volumes; normal diffusion at 28.3, which is  110% of predicted; normal flow volume loop.

## 2020-09-30 NOTE — PHYSICAL EXAM
[No Acute Distress] : no acute distress [Normal Oropharynx] : normal oropharynx [III] : Mallampati Class: III [Normal Appearance] : normal appearance [No Neck Mass] : no neck mass [Normal Rate/Rhythm] : normal rate/rhythm [Normal S1, S2] : normal s1, s2 [No Murmurs] : no murmurs [No Resp Distress] : no resp distress [Clear to Auscultation Bilaterally] : clear to auscultation bilaterally [No Abnormalities] : no abnormalities [Benign] : benign [Normal Gait] : normal gait [No Clubbing] : no clubbing [No Cyanosis] : no cyanosis [No Edema] : no edema [FROM] : FROM [Normal Color/ Pigmentation] : normal color/ pigmentation [No Focal Deficits] : no focal deficits [Oriented x3] : oriented x3 [Normal Affect] : normal affect [TextBox_68] : I:E 1:3, clear

## 2020-09-30 NOTE — HISTORY OF PRESENT ILLNESS
[TextBox_4] : Mr. LASHAUN ABERNATHY is a 55 year old male with hx of severe persistent asthma, sarcoidosis, nasal polyposis, allergic rhinitis, coming into the office for a f/u evaluation. His chief complaint is should pain. \par -energy level is good. \par -has been sleeping well. \par -has been exercising regularly. \par -sleeps well. \par -reports that he has should pain.  \par -sinuses are good. \par -he has been nebulizing BID religiously. \par -denies hoarseness. \par -denies coughing or wheezing. \par -he recently switched jobs. \par -has been doing well with Dupixent. \par \par -He denies any chest pain, chest pressure, diarrhea, constipation, dysphagia, dizziness, sour taste in the mouth, leg swelling, leg pain, itchy eyes, itchy ears, heartburn, reflux.

## 2020-09-30 NOTE — ASSESSMENT
[FreeTextEntry1] : Mr. LASHAUN ABERNATHY is a 54 year old male who has a history of chicken pox, chronic sinus disease, sarcoidoses, Cornelio's syndrome, severe persistent Asthma, GERD, Nasal and sinus Polyps, who now comes in for pulmonary evaluation. - now controlled on Dupixent for 6 months. \par \par The patient's SOB is felt to be multifactorial:\par -pulmonary \par    -Asthma ; less likely from sarcoidosis (inactive)  \par - Poor breathing mechanics\par - Overweight/ Out-of Shape\par - Restrictive Dysfunction or Obstructive Dysfunction\par - ?Cardiac Disease \par \par Problem 1: Severe Persistent Asthma - now controlled (improved) \par -Candidate of Biological therapy  \par prior IGE levels, had been on Xolair \par - s/p blood work for asthma profile (+), food IgE panel (+), eosinophil level (+), IgE level (+), Vitamin D level (low) \par -continue Bevespi 2 inhalations BID\par -continue Alvesco (160) 2 inhalations BID \par -Add QVar 2 inhalations BID\par -continue Zyflo 600 mg BID\par -continue Albuterol QID via Nebulizer \par -Asthma is believed to be caused by inherited (genetic) and environmental factor, but its exact cause is unknown. Asthma may be triggered by allergens, lung infections, or irritants in the air. Asthma triggers are different for each person \par  \par Problem 1A: Eosinophilic  Asthma - stable \par - Fasenra, Nucala, Dupixent Candidate - but on Dupixent x 6 months. \par Dupixent is a prescription medicine used with other asthma medicines for the maintenance treatment of moderate-to-severe asthma in people aged 12 years and older whose asthma is not controlled with their current asthma medicines. Dupixent helps prevent severe asthma attacks (exacerbations) and can improve your breathing. Dupixent may also help reduce the amount of oral corticosteroids you need while preventing severe asthma attacks and improving your breathing. Dupixent is not used to treat sudden breathing problems. Risks and side effect of Dupixent were discussed and reviewed with patient.\par \par \par Problem 1B: elevated IgE (125)\par - Candidate for Xolair \par -Xolair is a recombinant DNA- derived humanized IgG1K monoclonal antibody that selectively binds to human immunoglobulin E (IgE). Xolair is produced by a Chinese hamster ovary cell suspension culture in nutrient medium containing the antibiotic gentamicin. Gentamicin is not detectable in the final product. Xolair is a sterile, white, preservative free, lyophilized powder contained in a single use vial that is reconstituted with sterile water for suspension. Side effects include: wheezing, tightness of the chest, trouble breathing, hives, skin rash, feeling anxious or light-headed, fainting, warmth or tingling under skin, or swelling of face, lips, or tongue \par \par Problem 2: Sarcoidosis\par -currently inactive  \par -at this moment, no need for any therapy \par -Sarcoidosis is a medical mystery and can present with very serious illness or little consequence. The disease can affect any organ including skin, liver, lymph glands, spleen, eyes, nervous system, musculoskeletal system, heart, brain, kidneys, and including the lungs. Pulmonary sarcoidosis can cause loss of lung volume and abnormal lung stiffness. This disease is characterized by presence of granulomas, small areas of inflamed cells. Sarcoidosis patients should have regular cardiac and eye examinations as well as regular PFTs. \par \par Problem 2A: Low Vit D\par - On Vit D supplements \par Has been associated with asthma exacerbations and increased allergic symptoms. The goal based on recent information is maintaining levels between 50-70 and low normal is 30. Recommended 50,000 units every two weeks to once a month depending on the level.\par \par \par Problem 3:GERD \par - Things to avoid including overeating, spicy foods, tight clothing, eating within three hours of bed, this list is not all inclusive.\par \par - For treatment of reflux, possible options discussed including diet control, H2 blockers, PPIs, as well as coating motility agents discussed as treatment options. Timing of meals and proximity of last meal to sleep were discussed. If symptoms persist, a formal gastrointestinal evaluation is needed. \par \par Problem 4:Chronic Sinus Disease/ Nasal Polyps -improved \par -Recommended the use of Navage Rinsing system. Pulmicort .5  in the Navage \par -Dupixent 200 q2wh\par \par Problem 5: ?NAIDA\par -complete home sleep study \par - Due to his / her EDS, fatigue, snoring, elevated Mallampati class, poor memory, poor concentration, and neck size, he / she is being recommended for a home sleep study. \par - Sleep apnea is associated with adverse clinical consequences which an affect most organ systems. Cardiovascular disease risk includes arrhythmias, atrial fibrillation, hypertension, coronary artery disease, and stroke. Metabolic disorders include diabetes type 2, non-alcoholic fatty liver disease. Mood disorder especially depression; and cognitive decline especially in the elderly. Associations with chronic reflux/Akhtar’s esophagus some but not all inclusive. \par -Reasons include arousal consistent with hypopnea; respiratory events most prominent in REM sleep or supine position; therefore sleep staging and body position are important for accurate diagnosis and estimation of AHI. \par \par Problem 6: Overweight (improved) \par - Weight loss, exercise, and diet control were discussed and are highly encouraged. Treatment options were given such as, aqua therapy, and contacting a nutritionist. Recommended to use the elliptical, stationary bike, less use of treadmill. Mindful eating was explained to the patient Obesity is associated with worsening asthma, shortness of breath, and potential for cardiac disease, diabetes, and other underlying medical conditions.\par \par Problem 7: Poor Breathing Mechanics\par - Proper breathing techniques were reviewed with an emphasis of exhalation. Patient instructed to breath in for 1 second and out for four seconds. Patient was encouraged to not talk while walking.\par \par Problem 8: Health Maintenance/COVID19 Precautions:\par - Clean your hands often. Wash your hands often with soap and water for at least 20 seconds, especially after blowing your nose, coughing, or sneezing, or having been in a public place.\par - If soap and water are not available, use a hand  that contains at least 60% alcohol.\par - To the extent possible, avoid touching high-touch surfaces in public places - elevator buttons, door handles, handrails, handshaking with people, etc. Use a tissue or your sleeve to cover your hand or finger if you must touch something.\par - Wash your hands after touching surfaces in public places.\par - Avoid touching your face, nose, eyes, etc.\par - Clean and disinfect your home to remove germs: practice routine cleaning of frequently touched surfaces (for example: tables, doorknobs, light switches, handles, desks, toilets, faucets, sinks & cell phones)\par - Avoid crowds, especially in poorly ventilated spaces. Your risk of exposure to respiratory viruses like COVID-19 may increase in crowded, closed-in settings with little air circulation if\par there are people in the crowd who are sick. All patients are recommended to practice social distancing and stay at least 6 feet away from others.\par - Avoid all non-essential travel including plane trips, and especially avoid embarking on cruise ships.\par -If COVID-19 is spreading in your community, take extra measures to put distance between yourself and other people to further reduce your risk of being exposed to this new virus.\par -Stay home as much as possible.\par - Consider ways of getting food brought to your house through family, social, or commercial networks\par -Be aware that the virus has been known to live in the air up to 3 hours post exposure, cardboard up to 24 hours post exposure, copper up to 4 hours post exposure, steel and plastic up to 2-3 days post exposure. Risk of transmission from these surfaces are affected by many variables.\par \par Immune Support Recommendations:\par -OTC Vitamin C 500mg BID \par -OTC Quercetin 250-500mg BID \par -OTC Zinc 75-100mg per day \par -OTC Melatonin 1or 2mg a night \par -OTC Vitamin D 1-4000mg per day\par -Tonic Water 8oz\par -Recommended to Stay Hydrated (At least a gallon/day)\par \par Asthma and COVID19:\par You need to make sure your asthma is under control. This often requires the use of inhaled corticosteroids (and sometimes oral corticosteroids). Inhaled corticosteroids do not likely reduce your immune system’s ability to fight infections, but oral corticosteroids may. It is important to use the steps above to protect yourself to limit your exposure to any respiratory virus. \par \par Problem 9: Health maintenance\par -s/p flu shot 09/2020\par -recommended strep pneumonia vaccines: Prevnar-13 vaccine, followed by Pneumo vaccine 23 one year following\par -recommended early intervention for URIs\par -recommended regular osteoporosis evaluations\par -recommended early dermatological evaluations\par -recommended after the age of 50 to the age of 70, colonoscopy every 5 years\par \par f/u in 3 months. [facility]. \par pt is encouraged to call or fax the office with any questions or concerns.\par Explained to the pt in full detail with demonstrations how to use the inhalers and inhaler hygiene.\par -Education provided to the PT regarding their visit and conditions.

## 2020-09-30 NOTE — ADDENDUM
[FreeTextEntry1] : Documented by Zan Begum acting as a scribe for Dr. Arpit Lindquist on 09/30/2020 \par \par All medical record entries made by the Scribe were at my, Dr. Arpit Lindquist's, direction and personally dictated by me on 09/30/2020 . I have reviewed the chart and agree that the record accurately reflects my personal performance of the history, physical exam, assessment and plan. I have also personally directed, reviewed, and agree with the discharge instructions.

## 2020-11-11 ENCOUNTER — TRANSCRIPTION ENCOUNTER (OUTPATIENT)
Age: 55
End: 2020-11-11

## 2020-11-28 ENCOUNTER — RX RENEWAL (OUTPATIENT)
Age: 55
End: 2020-11-28

## 2021-02-01 ENCOUNTER — RX RENEWAL (OUTPATIENT)
Age: 56
End: 2021-02-01

## 2021-02-21 ENCOUNTER — TRANSCRIPTION ENCOUNTER (OUTPATIENT)
Age: 56
End: 2021-02-21

## 2021-03-12 ENCOUNTER — APPOINTMENT (OUTPATIENT)
Dept: PULMONOLOGY | Facility: CLINIC | Age: 56
End: 2021-03-12
Payer: COMMERCIAL

## 2021-03-12 VITALS
BODY MASS INDEX: 28.79 KG/M2 | SYSTOLIC BLOOD PRESSURE: 128 MMHG | WEIGHT: 190 LBS | DIASTOLIC BLOOD PRESSURE: 80 MMHG | HEIGHT: 68 IN | RESPIRATION RATE: 16 BRPM | TEMPERATURE: 97.1 F | OXYGEN SATURATION: 98 % | HEART RATE: 84 BPM

## 2021-03-12 PROCEDURE — 99072 ADDL SUPL MATRL&STAF TM PHE: CPT

## 2021-03-12 PROCEDURE — 71046 X-RAY EXAM CHEST 2 VIEWS: CPT

## 2021-03-12 PROCEDURE — 99214 OFFICE O/P EST MOD 30 MIN: CPT | Mod: 25

## 2021-03-12 RX ORDER — PREDNISONE 10 MG/1
10 TABLET ORAL
Qty: 50 | Refills: 0 | Status: DISCONTINUED | COMMUNITY
Start: 2020-03-10 | End: 2021-03-12

## 2021-03-12 RX ORDER — ZILEUTON 600 MG/1
600 TABLET, FILM COATED, EXTENDED RELEASE ORAL
Qty: 360 | Refills: 1 | Status: ACTIVE | COMMUNITY
Start: 2020-01-27 | End: 1900-01-01

## 2021-03-12 RX ORDER — PREDNISONE 10 MG/1
10 TABLET ORAL
Qty: 50 | Refills: 0 | Status: DISCONTINUED | COMMUNITY
Start: 2020-01-27 | End: 2021-03-12

## 2021-03-12 RX ORDER — FLUTICASONE FUROATE AND VILANTEROL TRIFENATATE 200; 25 UG/1; UG/1
200-25 POWDER RESPIRATORY (INHALATION)
Refills: 0 | Status: DISCONTINUED | COMMUNITY
End: 2021-03-12

## 2021-03-12 NOTE — HISTORY OF PRESENT ILLNESS
[TextBox_4] : Mr. LASHAUN ABERNATHY is a 55 year old male with hx of severe persistent asthma, sarcoidosis, nasal polyposis, allergic rhinitis, coming into the office for a f/u evaluation. His chief complaint is\par - he notes he has lost some weight, he has been exercising on the elliptical and does sit ups, 45 minutes a day \par - no SOB \par - no coughing / wheezing \par - he has been sleeping well\par - he notes he snores a little bit but it has gotten better \par - he notes he has been on dupixent for 11 months now \par - he does the albuterol everyday twice a day\par - he uses the qvar and bevespi as well \par - he has been feeling food in general\par - sinuses have been good \par - he notes he has switched medical providers so he has been having some difficulty getting dupixent \par - He  denies any visual issues, headaches, nausea, vomiting, fever, chills, sweats, chest pains, chest pressure, diarrhea, constipation, dysphagia, myalgia, dizziness, leg swelling, leg pain, itchy eyes, itchy ears, heartburn, reflux, or sour taste in the mouth.\par

## 2021-03-12 NOTE — ASSESSMENT
[FreeTextEntry1] : Mr. LASHAUN ABERNATHY is a 55 year old male who has a history of chicken pox, chronic sinus disease, sarcoidoses, Cornelio's syndrome, severe persistent Asthma, GERD, Nasal and sinus Polyps, who now comes in for pulmonary evaluation. - now controlled on Dupixent for 11 months. \par \par The patient's SOB is felt to be multifactorial:\par -pulmonary \par    -Asthma ; less likely from sarcoidosis (inactive)  \par - Poor breathing mechanics\par - Overweight/ Out-of Shape\par - Restrictive Dysfunction or Obstructive Dysfunction\par - ?Cardiac Disease \par \par Problem 1: Severe Persistent Asthma - now controlled (improved) \par -Candidate of Biological therapy  \par prior IGE levels, had been on Xolair \par - s/p blood work for asthma profile (+), food IgE panel (+), eosinophil level (+), IgE level (+), Vitamin D level (low) \par -continue Bevespi 2 inhalations BID\par -continue Alvesco (160) 2 inhalations BID \par -continue QVar 2 inhalations BID\par -continue Zyflo 600 mg BID\par -continue Albuterol QID via Nebulizer PRN \par -Asthma is believed to be caused by inherited (genetic) and environmental factor, but its exact cause is unknown. Asthma may be triggered by allergens, lung infections, or irritants in the air. Asthma triggers are different for each person \par  \par Problem 1A: Eosinophilic  Asthma - stable \par - Fasenra, Nucala, Dupixent Candidate - but on Dupixent x 11 months. \par Dupixent is a prescription medicine used with other asthma medicines for the maintenance treatment of moderate-to-severe asthma in people aged 12 years and older whose asthma is not controlled with their current asthma medicines. Dupixent helps prevent severe asthma attacks (exacerbations) and can improve your breathing. Dupixent may also help reduce the amount of oral corticosteroids you need while preventing severe asthma attacks and improving your breathing. Dupixent is not used to treat sudden breathing problems. Risks and side effect of Dupixent were discussed and reviewed with patient.\par \par \par Problem 1B: elevated IgE (125)\par - Candidate for Xolair \par -Xolair is a recombinant DNA- derived humanized IgG1K monoclonal antibody that selectively binds to human immunoglobulin E (IgE). Xolair is produced by a Chinese hamster ovary cell suspension culture in nutrient medium containing the antibiotic gentamicin. Gentamicin is not detectable in the final product. Xolair is a sterile, white, preservative free, lyophilized powder contained in a single use vial that is reconstituted with sterile water for suspension. Side effects include: wheezing, tightness of the chest, trouble breathing, hives, skin rash, feeling anxious or light-headed, fainting, warmth or tingling under skin, or swelling of face, lips, or tongue \par \par Problem 2: Sarcoidosis\par -currently inactive  \par -at this moment, no need for any therapy \par -Sarcoidosis is a medical mystery and can present with very serious illness or little consequence. The disease can affect any organ including skin, liver, lymph glands, spleen, eyes, nervous system, musculoskeletal system, heart, brain, kidneys, and including the lungs. Pulmonary sarcoidosis can cause loss of lung volume and abnormal lung stiffness. This disease is characterized by presence of granulomas, small areas of inflamed cells. Sarcoidosis patients should have regular cardiac and eye examinations as well as regular PFTs. \par \par Problem 2A: Low Vit D\par - On Vit D supplements \par Has been associated with asthma exacerbations and increased allergic symptoms. The goal based on recent information is maintaining levels between 50-70 and low normal is 30. Recommended 50,000 units every two weeks to once a month depending on the level.\par \par \par Problem 3:GERD \par - Things to avoid including overeating, spicy foods, tight clothing, eating within three hours of bed, this list is not all inclusive.\par \par - For treatment of reflux, possible options discussed including diet control, H2 blockers, PPIs, as well as coating motility agents discussed as treatment options. Timing of meals and proximity of last meal to sleep were discussed. If symptoms persist, a formal gastrointestinal evaluation is needed. \par \par Problem 4:Chronic Sinus Disease/ Nasal Polyps -improved \par -Recommended the use of Navage Rinsing system. Pulmicort .5  in the Navage \par -Dupixent 200 q2wh\par \par Problem 5: (-)NAIDA- negative \par -s/p home sleep study - 7/2020 \par - Due to his / her EDS, fatigue, snoring, elevated Mallampati class, poor memory, poor concentration, and neck size, he / she is being recommended for a home sleep study. \par - Sleep apnea is associated with adverse clinical consequences which an affect most organ systems. Cardiovascular disease risk includes arrhythmias, atrial fibrillation, hypertension, coronary artery disease, and stroke. Metabolic disorders include diabetes type 2, non-alcoholic fatty liver disease. Mood disorder especially depression; and cognitive decline especially in the elderly. Associations with chronic reflux/Akhtar’s esophagus some but not all inclusive. \par -Reasons include arousal consistent with hypopnea; respiratory events most prominent in REM sleep or supine position; therefore sleep staging and body position are important for accurate diagnosis and estimation of AHI. \par \par Problem 6: Overweight (improved) \par - Weight loss, exercise, and diet control were discussed and are highly encouraged. Treatment options were given such as, aqua therapy, and contacting a nutritionist. Recommended to use the elliptical, stationary bike, less use of treadmill. Mindful eating was explained to the patient Obesity is associated with worsening asthma, shortness of breath, and potential for cardiac disease, diabetes, and other underlying medical conditions.\par \par Problem 7: Poor Breathing Mechanics\par - Proper breathing techniques were reviewed with an emphasis of exhalation. Patient instructed to breath in for 1 second and out for four seconds. Patient was encouraged to not talk while walking.\par \par Problem 8: Health Maintenance/COVID19 Precautions: d/w pt COVID-19 vaccine \par - Clean your hands often. Wash your hands often with soap and water for at least 20 seconds, especially after blowing your nose, coughing, or sneezing, or having been in a public place.\par - If soap and water are not available, use a hand  that contains at least 60% alcohol.\par - To the extent possible, avoid touching high-touch surfaces in public places - elevator buttons, door handles, handrails, handshaking with people, etc. Use a tissue or your sleeve to cover your hand or finger if you must touch something.\par - Wash your hands after touching surfaces in public places.\par - Avoid touching your face, nose, eyes, etc.\par - Clean and disinfect your home to remove germs: practice routine cleaning of frequently touched surfaces (for example: tables, doorknobs, light switches, handles, desks, toilets, faucets, sinks & cell phones)\par - Avoid crowds, especially in poorly ventilated spaces. Your risk of exposure to respiratory viruses like COVID-19 may increase in crowded, closed-in settings with little air circulation if\par there are people in the crowd who are sick. All patients are recommended to practice social distancing and stay at least 6 feet away from others.\par - Avoid all non-essential travel including plane trips, and especially avoid embarking on cruise ships.\par -If COVID-19 is spreading in your community, take extra measures to put distance between yourself and other people to further reduce your risk of being exposed to this new virus.\par -Stay home as much as possible.\par - Consider ways of getting food brought to your house through family, social, or commercial networks\par -Be aware that the virus has been known to live in the air up to 3 hours post exposure, cardboard up to 24 hours post exposure, copper up to 4 hours post exposure, steel and plastic up to 2-3 days post exposure. Risk of transmission from these surfaces are affected by many variables.\par \par Immune Support Recommendations:\par -OTC Vitamin C 500mg BID \par -OTC Quercetin 250-500mg BID \par -OTC Zinc 75-100mg per day \par -OTC Melatonin 1or 2mg a night \par -OTC Vitamin D 1-4000mg per day\par -Tonic Water 8oz\par -Recommended to Stay Hydrated (At least a gallon/day)\par \par Asthma and COVID19:\par You need to make sure your asthma is under control. This often requires the use of inhaled corticosteroids (and sometimes oral corticosteroids). Inhaled corticosteroids do not likely reduce your immune system’s ability to fight infections, but oral corticosteroids may. It is important to use the steps above to protect yourself to limit your exposure to any respiratory virus. \par \par Problem 9: Health maintenance\par -s/p flu shot 09/2020\par -recommended strep pneumonia vaccines: Prevnar-13 vaccine, followed by Pneumo vaccine 23 one year following\par -recommended early intervention for URIs\par -recommended regular osteoporosis evaluations\par -recommended early dermatological evaluations\par -recommended after the age of 50 to the age of 70, colonoscopy every 5 years\par \par f/u in 3 months. [facility]. \par pt is encouraged to call or fax the office with any questions or concerns.\par Explained to the pt in full detail with demonstrations how to use the inhalers and inhaler hygiene.\par -Education provided to the PT regarding their visit and conditions.

## 2021-03-12 NOTE — PROCEDURE
[FreeTextEntry1] : \par Sleep study (7/2020) revealed sleep apnea with an AHI/PAKO of 0, snore index of % and a low oxygen saturation of 86% \par \par \par \par  CXR reveals  normal sized heart; there was no evidence of infiltrate or effusion ; mild prominent hilar b/l

## 2021-03-12 NOTE — ADDENDUM
[FreeTextEntry1] : Documented by Carissa Wilson acting as a scribe for Dr. Arpit Lindquist on 03/12/2021 \par \par All medical record entries made by the Scribe were at my, Dr. Arpit Lindquist's, direction and personally dictated by me on 03/12/2021 . I have reviewed the chart and agree that the record accurately reflects my personal performance of the history, physical exam, assessment and plan. I have also personally directed, reviewed, and agree with the discharge instructions.

## 2021-03-17 ENCOUNTER — RX RENEWAL (OUTPATIENT)
Age: 56
End: 2021-03-17

## 2021-04-06 ENCOUNTER — TRANSCRIPTION ENCOUNTER (OUTPATIENT)
Age: 56
End: 2021-04-06

## 2021-05-13 ENCOUNTER — RX RENEWAL (OUTPATIENT)
Age: 56
End: 2021-05-13

## 2021-05-28 ENCOUNTER — RX RENEWAL (OUTPATIENT)
Age: 56
End: 2021-05-28

## 2021-07-20 ENCOUNTER — APPOINTMENT (OUTPATIENT)
Dept: PULMONOLOGY | Facility: CLINIC | Age: 56
End: 2021-07-20
Payer: COMMERCIAL

## 2021-07-20 ENCOUNTER — NON-APPOINTMENT (OUTPATIENT)
Age: 56
End: 2021-07-20

## 2021-07-20 VITALS
BODY MASS INDEX: 29.7 KG/M2 | OXYGEN SATURATION: 97 % | HEART RATE: 60 BPM | RESPIRATION RATE: 16 BRPM | DIASTOLIC BLOOD PRESSURE: 76 MMHG | WEIGHT: 196 LBS | SYSTOLIC BLOOD PRESSURE: 120 MMHG | TEMPERATURE: 97.2 F | HEIGHT: 68 IN

## 2021-07-20 PROCEDURE — 94010 BREATHING CAPACITY TEST: CPT

## 2021-07-20 PROCEDURE — 99214 OFFICE O/P EST MOD 30 MIN: CPT | Mod: 25

## 2021-07-20 PROCEDURE — ZZZZZ: CPT

## 2021-07-20 PROCEDURE — 95012 NITRIC OXIDE EXP GAS DETER: CPT

## 2021-07-20 PROCEDURE — 99072 ADDL SUPL MATRL&STAF TM PHE: CPT

## 2021-07-20 PROCEDURE — 94729 DIFFUSING CAPACITY: CPT

## 2021-07-20 NOTE — PROCEDURE
[FreeTextEntry1] : PFT revealed normal flows, with a FEV1 of 3.5L, which is 98% of predicted, normal lung volumes, and with a normal flow volume loop. \par \par Full PFT revealed normal flows, with a FEV1 of 3.46L, which is 101% of predicted, normal lung volumes, and a diffusion of 31.2, which is 124% of predicted, with a normal flow volume loop \par  \par \par Feno was 23; a normal value being less than 25. Fractional exhaled nitric oxide (FENO) is regarded as a simple, noninvasive method for assessing eosinophilic airway inflammation. Produced by a variety of cells within the lung, nitric oxide (NO) concentrations are generally low in healthy individuals. However, high concentrations of NO appear to be involved in nonspecific host defense mechanisms and chronic inflammatory  diseases such as asthma. The American Thoracic Society (ATS) therefore recommended using FENO to aid in the diagnosis and monitoring of eosinophilic airway inflammation and asthma, and for identifying steroid responsive individuals whose chronic respiratory symptoms may be caused by airway inflammation \par \par -Images and procedures reviewed in detail and discussed with patient.

## 2021-07-20 NOTE — ASSESSMENT
[FreeTextEntry1] : Mr. LASHAUN ABERNATHY is a 55 year old male who has a history of chicken pox, chronic sinus disease, sarcoidoses, Cornelio's syndrome, severe persistent Asthma, GERD, Nasal and sinus Polyps, who now comes in for pulmonary evaluation. - now controlled on Dupixent for over 1 year - recent vertigo symptoms . \par \par The patient's SOB is felt to be multifactorial:\par -pulmonary \par    -Asthma ; less likely from sarcoidosis (inactive)  \par - Poor breathing mechanics\par - Overweight/ Out-of Shape\par - Restrictive Dysfunction or Obstructive Dysfunction\par - ?Cardiac Disease \par \par Problem 1: Severe Persistent Asthma - now controlled (improved) \par -Candidate of Biological therapy  \par prior IGE levels, had been on Xolair \par - s/p blood work for asthma profile (+), food IgE panel (+), eosinophil level (+), IgE level (+), Vitamin D level (low) \par -continue Bevespi 2 inhalations BID\par -continue Alvesco (160) 2 inhalations BID \par -continue QVar 2 inhalations BID\par -continue Zyflo 600 mg BID\par -continue Albuterol QID via Nebulizer PRN \par -Asthma is believed to be caused by inherited (genetic) and environmental factor, but its exact cause is unknown. Asthma may be triggered by allergens, lung infections, or irritants in the air. Asthma triggers are different for each person \par  \par Problem 1A: Eosinophilic  Asthma - stable \par - Fasenra, Aracelisala, Dupixent Candidate - but on Dupixent x over 1 year \par Dupixent is a prescription medicine used with other asthma medicines for the maintenance treatment of moderate-to-severe asthma in people aged 12 years and older whose asthma is not controlled with their current asthma medicines. Dupixent helps prevent severe asthma attacks (exacerbations) and can improve your breathing. Dupixent may also help reduce the amount of oral corticosteroids you need while preventing severe asthma attacks and improving your breathing. Dupixent is not used to treat sudden breathing problems. Risks and side effect of Dupixent were discussed and reviewed with patient.\par \par \par Problem 1B: elevated IgE (125)\par - Candidate for Xolair \par -Xolair is a recombinant DNA- derived humanized IgG1K monoclonal antibody that selectively binds to human immunoglobulin E (IgE). Xolair is produced by a Chinese hamster ovary cell suspension culture in nutrient medium containing the antibiotic gentamicin. Gentamicin is not detectable in the final product. Xolair is a sterile, white, preservative free, lyophilized powder contained in a single use vial that is reconstituted with sterile water for suspension. Side effects include: wheezing, tightness of the chest, trouble breathing, hives, skin rash, feeling anxious or light-headed, fainting, warmth or tingling under skin, or swelling of face, lips, or tongue \par \par Problem 2: Sarcoidosis\par -currently inactive  \par -at this moment, no need for any therapy \par -Sarcoidosis is a medical mystery and can present with very serious illness or little consequence. The disease can affect any organ including skin, liver, lymph glands, spleen, eyes, nervous system, musculoskeletal system, heart, brain, kidneys, and including the lungs. Pulmonary sarcoidosis can cause loss of lung volume and abnormal lung stiffness. This disease is characterized by presence of granulomas, small areas of inflamed cells. Sarcoidosis patients should have regular cardiac and eye examinations as well as regular PFTs. \par \par Problem 2A: Low Vit D\par - On Vit D supplements \par Has been associated with asthma exacerbations and increased allergic symptoms. The goal based on recent information is maintaining levels between 50-70 and low normal is 30. Recommended 50,000 units every two weeks to once a month depending on the level.\par \par \par Problem 3:GERD \par - Things to avoid including overeating, spicy foods, tight clothing, eating within three hours of bed, this list is not all inclusive.\par \par - For treatment of reflux, possible options discussed including diet control, H2 blockers, PPIs, as well as coating motility agents discussed as treatment options. Timing of meals and proximity of last meal to sleep were discussed. If symptoms persist, a formal gastrointestinal evaluation is needed. \par \par Problem 4A: Vertigo\par -Add Prednisone 20mg for 7 days then 10mg for 7 days \par -Information sheet given to the patient to be reviewed, this medication is never to be used without consulting the prescribing physician. Proper dietary restraint is necessary specifically salt containing foods, if any reaction may occur should be reported. \par \par Problem 4:Chronic Sinus Disease/ Nasal Polyps -improved \par -Recommended the use of Navage Rinsing system. Pulmicort .5  in the Navage \par -Dupixent 200 q2wh\par \par Problem 5: (-)NAIDA- negative \par -s/p home sleep study - 7/2020 \par - Due to his / her EDS, fatigue, snoring, elevated Mallampati class, poor memory, poor concentration, and neck size, he / she is being recommended for a home sleep study. \par - Sleep apnea is associated with adverse clinical consequences which an affect most organ systems. Cardiovascular disease risk includes arrhythmias, atrial fibrillation, hypertension, coronary artery disease, and stroke. Metabolic disorders include diabetes type 2, non-alcoholic fatty liver disease. Mood disorder especially depression; and cognitive decline especially in the elderly. Associations with chronic reflux/Akhtar’s esophagus some but not all inclusive. \par -Reasons include arousal consistent with hypopnea; respiratory events most prominent in REM sleep or supine position; therefore sleep staging and body position are important for accurate diagnosis and estimation of AHI. \par \par Problem 6: Overweight (improved) \par - Weight loss, exercise, and diet control were discussed and are highly encouraged. Treatment options were given such as, aqua therapy, and contacting a nutritionist. Recommended to use the elliptical, stationary bike, less use of treadmill. Mindful eating was explained to the patient Obesity is associated with worsening asthma, shortness of breath, and potential for cardiac disease, diabetes, and other underlying medical conditions.\par \par Problem 7: Poor Breathing Mechanics\par - Proper breathing techniques were reviewed with an emphasis of exhalation. Patient instructed to breath in for 1 second and out for four seconds. Patient was encouraged to not talk while walking.\par \par Problem 8: Health Maintenance/COVID19 Precautions: d/w pt COVID-19 vaccine \par - Clean your hands often. Wash your hands often with soap and water for at least 20 seconds, especially after blowing your nose, coughing, or sneezing, or having been in a public place.\par - If soap and water are not available, use a hand  that contains at least 60% alcohol.\par - To the extent possible, avoid touching high-touch surfaces in public places - elevator buttons, door handles, handrails, handshaking with people, etc. Use a tissue or your sleeve to cover your hand or finger if you must touch something.\par - Wash your hands after touching surfaces in public places.\par - Avoid touching your face, nose, eyes, etc.\par - Clean and disinfect your home to remove germs: practice routine cleaning of frequently touched surfaces (for example: tables, doorknobs, light switches, handles, desks, toilets, faucets, sinks & cell phones)\par - Avoid crowds, especially in poorly ventilated spaces. Your risk of exposure to respiratory viruses like COVID-19 may increase in crowded, closed-in settings with little air circulation if\par there are people in the crowd who are sick. All patients are recommended to practice social distancing and stay at least 6 feet away from others.\par - Avoid all non-essential travel including plane trips, and especially avoid embarking on cruise ships.\par -If COVID-19 is spreading in your community, take extra measures to put distance between yourself and other people to further reduce your risk of being exposed to this new virus.\par -Stay home as much as possible.\par - Consider ways of getting food brought to your house through family, social, or commercial networks\par -Be aware that the virus has been known to live in the air up to 3 hours post exposure, cardboard up to 24 hours post exposure, copper up to 4 hours post exposure, steel and plastic up to 2-3 days post exposure. Risk of transmission from these surfaces are affected by many variables.\par \par Immune Support Recommendations:\par -OTC Vitamin C 500mg BID \par -OTC Quercetin 250-500mg BID \par -OTC Zinc 75-100mg per day \par -OTC Melatonin 1or 2mg a night \par -OTC Vitamin D 1-4000mg per day\par -Tonic Water 8oz\par -Recommended to Stay Hydrated (At least a gallon/day)\par \par Asthma and COVID19:\par You need to make sure your asthma is under control. This often requires the use of inhaled corticosteroids (and sometimes oral corticosteroids). Inhaled corticosteroids do not likely reduce your immune system’s ability to fight infections, but oral corticosteroids may. It is important to use the steps above to protect yourself to limit your exposure to any respiratory virus. \par \par Problem 9: Health maintenance\par -s/p flu shot 09/2020\par -recommended strep pneumonia vaccines: Prevnar-13 vaccine, followed by Pneumo vaccine 23 one year following\par -recommended early intervention for URIs\par -recommended regular osteoporosis evaluations\par -recommended early dermatological evaluations\par -recommended after the age of 50 to the age of 70, colonoscopy every 5 years\par \par f/u in 3 months. [facility]. \par pt is encouraged to call or fax the office with any questions or concerns.\par Explained to the pt in full detail with demonstrations how to use the inhalers and inhaler hygiene.\par -Education provided to the PT regarding their visit and conditions.

## 2021-07-20 NOTE — HISTORY OF PRESENT ILLNESS
[TextBox_4] : Mr. LASHAUN ABERNATHY is a 55 year old male with hx of severe persistent asthma, sarcoidosis, nasal polyposis, allergic rhinitis, coming into the office for a f/u evaluation. His chief complaint is\par -He notes overall he has been well in general\par -He notes recently gaining 5 lbs but his weight is overall stable\par -He notes having dizziness, chills and being unable to get out of bed after his covid 19 vaccine on 2/2021\par -He notes 2 nights ago and this morning feeling light headed when waking up and almost losing consciousness\par -He notes his sinuses are clear \par -He denies sneezing\par -He denies any visual issues\par -He notes he is sleeping well \par \par - patient denies any headaches, nausea, vomiting, fever, chills, sweats, chest pain, chest pressure, palpitations, coughing, wheezing, fatigue, diarrhea, constipation, dysphagia, myalgias, leg swelling, leg pain, itchy eyes, itchy ears, heartburn, reflux or sour taste in the mouth

## 2021-07-20 NOTE — ADDENDUM
[FreeTextEntry1] : Documented by Julien Bullard acting as a scribe for Dr. Arpit Lindquist on (07/20/2021).\par \par All medical record entries made by the Scribe were at my, Dr. Arpit Lindquist's, direction and personally dictated by me on (07/20/2021). I have reviewed the chart and agree that the record accurately reflects my personal performance of the history, physical exam, assessment and plan. I have also personally directed, reviewed, and agree with the discharge instructions.\par

## 2021-10-14 ENCOUNTER — RX RENEWAL (OUTPATIENT)
Age: 56
End: 2021-10-14

## 2021-11-16 ENCOUNTER — APPOINTMENT (OUTPATIENT)
Dept: PULMONOLOGY | Facility: CLINIC | Age: 56
End: 2021-11-16

## 2021-12-08 ENCOUNTER — NON-APPOINTMENT (OUTPATIENT)
Age: 56
End: 2021-12-08

## 2021-12-08 ENCOUNTER — APPOINTMENT (OUTPATIENT)
Dept: PULMONOLOGY | Facility: CLINIC | Age: 56
End: 2021-12-08
Payer: COMMERCIAL

## 2021-12-08 VITALS
HEIGHT: 68 IN | OXYGEN SATURATION: 97 % | DIASTOLIC BLOOD PRESSURE: 80 MMHG | WEIGHT: 197 LBS | TEMPERATURE: 97.1 F | RESPIRATION RATE: 16 BRPM | BODY MASS INDEX: 29.86 KG/M2 | HEART RATE: 62 BPM | SYSTOLIC BLOOD PRESSURE: 118 MMHG

## 2021-12-08 PROCEDURE — 94010 BREATHING CAPACITY TEST: CPT

## 2021-12-08 PROCEDURE — 99214 OFFICE O/P EST MOD 30 MIN: CPT | Mod: 25

## 2021-12-08 PROCEDURE — 95012 NITRIC OXIDE EXP GAS DETER: CPT

## 2021-12-08 NOTE — ADDENDUM
[FreeTextEntry1] : Documented by Duke Garay acting as a scribe for Dr. Arpit Lindquist on 12/08/2021 \par \par All medical record entries made by the Scribe were at my, Dr. Arpit Lindquist's, direction and personally dictated by me on 12/08/2021 . I have reviewed the chart and agree that the record accurately reflects my personal performance of the history, physical exam, assessment and plan. I have also personally directed, reviewed, and agree with the discharge instructions

## 2021-12-08 NOTE — HISTORY OF PRESENT ILLNESS
[TextBox_4] : Mr. LASHAUN ABERNATHY is a 56 year old male with hx of severe persistent asthma, sarcoidosis, nasal polyposis, allergic rhinitis, coming into the office for a f/u evaluation. His chief complaint is\par \par -s/p sinus infection that started with itchy throat and was aggravated with alcohol that causes sinus issues \par -he notes given antibiotics for 10 days \par -he notes now resolved and almost back to baseline with lingering mild PND \par -he notes use of eliptical for exercise\par -he notes increased eating and snaking\par -he notes weight is stable\par -he notes use of glasses but vision is unchanged\par -He notes that bowels are regular \par -he denies nocturia \par -he notes mostly working from home \par -s/p booster and flu shot \par -he notes vertigo resolved \par \par -denies any visual issues, headaches, nausea, vomiting, fever, chills, sweats, chest pain, chest pressure, diarrhea, constipation, dysphagia, dizziness, leg swelling, leg pain, itchy eyes, itchy ears, heartburn, reflux, or sour taste in the mouth. \par \par

## 2021-12-08 NOTE — PROCEDURE
[FreeTextEntry1] : FENO was  36; a normal value being less than 25\par Fractional exhaled nitric oxide (FENO) is regarded as a simple, noninvasive method for assessing eosinophilic airway inflammation. Produced by a variety of cells within the lung, nitric oxide (NO) concentrations are generally low in healthy individuals. However, high concentrations of NO appear to be involved in nonspecific host defense mechanisms and chronic inflammatory diseases such as asthma. The American Thoracic Society (ATS) therefore has recommended using FENO to aid in the diagnosis and monitoring of eosinophilic airway inflammation and asthma, and for identifying steroid responsive individuals whose chronic respiratory symptoms may be caused by airway inflammation. \par \par PFT revealed normal flows, with a FEV1 of 3.44L,which is 97% of predicted with a normal flow volume loop \par

## 2021-12-08 NOTE — ASSESSMENT
[FreeTextEntry1] : Mr. LASHAUN ABERNATHY is a 56 year old male who has a history of chicken pox, chronic sinus disease, sarcoidoses, Cornelio's syndrome, severe persistent Asthma, GERD, Nasal and sinus Polyps, who now comes in for pulmonary evaluation. - now controlled on Dupixent for over 1 year - stable\par \par The patient's SOB is felt to be multifactorial:\par -pulmonary \par    -Asthma ; less likely from sarcoidosis (inactive)  \par - Poor breathing mechanics\par - Overweight/ Out-of Shape\par - Restrictive Dysfunction or Obstructive Dysfunction\par - ?Cardiac Disease \par \par Problem 1: Severe Persistent Asthma - now controlled (improved) \par -Candidate of Biological therapy  \par prior IGE levels, had been on Xolair \par - s/p blood work for asthma profile (+), food IgE panel (+), eosinophil level (+), IgE level (+), Vitamin D level (low) \par -continue Bevespi 2 inhalations BID\par -continue Alvesco (160) 2 inhalations BID \par -continue QVar 2 inhalations BID\par -continue Zyflo 600 mg BID\par -continue Albuterol QID via Nebulizer PRN \par -Asthma is believed to be caused by inherited (genetic) and environmental factor, but its exact cause is unknown. Asthma may be triggered by allergens, lung infections, or irritants in the air. 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 Dupixent helps prevent severe asthma attacks (exacerbations) and can improve your breathing. Dupixent may also help reduce the amount of oral corticosteroids you need while preventing severe asthma attacks and improving your breathing. Dupixent is not used to treat sudden breathing problems. Risks and side effect of Dupixent were discussed and reviewed with patient.\par \par \par Problem 1B: elevated IgE (125)\par - Candidate for Xolair \par -Xolair is a recombinant DNA- derived humanized IgG1K monoclonal antibody that selectively binds to human immunoglobulin E (IgE). Xolair is produced by a Chinese hamster ovary cell suspension culture in nutrient medium containing the antibiotic gentamicin. Gentamicin is not detectable in the final product. Xolair is a sterile, white, preservative free, lyophilized powder contained in a single use vial that is reconstituted with sterile water for suspension. Side effects include: wheezing, tightness of the chest, trouble breathing, hives, skin rash, feeling anxious or light-headed, fainting, warmth or tingling under skin, or swelling of face, lips, or tongue \par \par Problem 2: Sarcoidosis\par -currently inactive  \par -at this moment, no need for any therapy \par -Sarcoidosis is a medical mystery and can present with very serious illness or little consequence. The disease can affect any organ including skin, liver, lymph glands, spleen, eyes, nervous system, musculoskeletal system, heart, brain, kidneys, and including the lungs. Pulmonary sarcoidosis can cause loss of lung volume and abnormal lung stiffness. This disease is characterized by presence of granulomas, small areas of inflamed cells. Sarcoidosis patients should have regular cardiac and eye examinations as well as regular PFTs. \par \par Problem 2A: Low Vit D\par - On Vit D supplements \par Has been associated with asthma exacerbations and increased allergic symptoms. The goal based on recent information is maintaining levels between 50-70 and low normal is 30. Recommended 50,000 units every two weeks to once a month depending on the level.\par \par \par Problem 3:GERD \par - Things to avoid including overeating, spicy foods, tight clothing, eating within three hours of bed, this list is not all inclusive.\par \par - For treatment of reflux, possible options discussed including diet control, H2 blockers, PPIs, as well as coating motility agents discussed as treatment options. Timing of meals and proximity of last meal to sleep were discussed. If symptoms persist, a formal gastrointestinal evaluation is needed. \par \par Problem 4A: Vertigo\par -s/p Prednisone 20mg for 7 days then 10mg for 7 days- 7/2021\par -Information sheet given to the patient to be reviewed, this medication is never to be used without consulting the prescribing physician. Proper dietary restraint is necessary specifically salt containing foods, if any reaction may occur should be reported. \par \par Problem 4:Chronic Sinus Disease/ Nasal Polyps -improved \par -Recommended the use of Navage Rinsing system. Pulmicort .5  in the Navage \par -Dupixent 200 q2wh\par \par Problem 5: (-)NAIDA- negative \par -s/p home sleep study - 7/2020 \par - Due to his / her EDS, fatigue, snoring, elevated Mallampati class, poor memory, poor concentration, and neck size, he / she is being recommended for a home sleep study. \par - Sleep apnea is associated with adverse clinical consequences which an affect most organ systems. Cardiovascular disease risk includes arrhythmias, atrial fibrillation, hypertension, coronary artery disease, and stroke. Metabolic disorders include diabetes type 2, non-alcoholic fatty liver disease. Mood disorder especially depression; and cognitive decline especially in the elderly. Associations with chronic reflux/Akhtar’s esophagus some but not all inclusive. \par -Reasons include arousal consistent with hypopnea; respiratory events most prominent in REM sleep or supine position; therefore sleep staging and body position are important for accurate diagnosis and estimation of AHI. \par \par Problem 6: Overweight (improved) \par - Weight loss, exercise, and diet control were discussed and are highly encouraged. Treatment options were given such as, aqua therapy, and contacting a nutritionist. Recommended to use the elliptical, stationary bike, less use of treadmill. Mindful eating was explained to the patient Obesity is associated with worsening asthma, shortness of breath, and potential for cardiac disease, diabetes, and other underlying medical conditions.\par \par Problem 7: Poor Breathing Mechanics\par - Proper breathing techniques were reviewed with an emphasis of exhalation. Patient instructed to breath in for 1 second and out for four seconds. Patient was encouraged to not talk while walking.\par \par Problem 8: Health Maintenance/COVID19 Precautions:\par -s/p Covid 19 Pfizer Vaccine x3\par -discussed and recommended Covid 19 precaution, vaccine, and booster at length. \par - Clean your hands often. Wash your hands often with soap and water for at least 20 seconds, especially after blowing your nose, coughing, or sneezing, or having been in a public place.\par - If soap and water are not available, use a hand  that contains at least 60% alcohol.\par - To the extent possible, avoid touching high-touch surfaces in public places - elevator buttons, door handles, handrails, handshaking with people, etc. Use a tissue or your sleeve to cover your hand or finger if you must touch something.\par - Wash your hands after touching surfaces in public places.\par - Avoid touching your face, nose, eyes, etc.\par - Clean and disinfect your home to remove germs: practice routine cleaning of frequently touched surfaces (for example: tables, doorknobs, light switches, handles, desks, toilets, faucets, sinks & cell phones)\par - Avoid crowds, especially in poorly ventilated spaces. Your risk of exposure to respiratory viruses like COVID-19 may increase in crowded, closed-in settings with little air circulation if\par there are people in the crowd who are sick. All patients are recommended to practice social distancing and stay at least 6 feet away from others.\par - Avoid all non-essential travel including plane trips, and especially avoid embarking on cruise ships.\par -If COVID-19 is spreading in your community, take extra measures to put distance between yourself and other people to further reduce your risk of being exposed to this new virus.\par -Stay home as much as possible.\par - Consider ways of getting food brought to your house through family, social, or commercial networks\par -Be aware that the virus has been known to live in the air up to 3 hours post exposure, cardboard up to 24 hours post exposure, copper up to 4 hours post exposure, steel and plastic up to 2-3 days post exposure. Risk of transmission from these surfaces are affected by many variables.\par \par Immune Support Recommendations:\par -OTC Vitamin C 500mg BID \par -OTC Quercetin 250-500mg BID \par -OTC Zinc 75-100mg per day \par -OTC Melatonin 1or 2mg a night \par -OTC Vitamin D 1-4000mg per day\par -Tonic Water 8oz\par -Recommended to Stay Hydrated (At least a gallon/day)\par \par Asthma and COVID19:\par You need to make sure your asthma is under control. This often requires the use of inhaled corticosteroids (and sometimes oral corticosteroids). Inhaled corticosteroids do not likely reduce your immune system’s ability to fight infections, but oral corticosteroids may. It is important to use the steps above to protect yourself to limit your exposure to any respiratory virus. \par \par Problem 9: Health maintenance\par -s/p flu shot-2021\par -recommended strep pneumonia vaccines: Prevnar-13 vaccine, followed by Pneumo vaccine 23 one year following\par -recommended early intervention for URIs\par -recommended regular osteoporosis evaluations\par -recommended early dermatological evaluations\par -recommended after the age of 50 to the age of 70, colonoscopy every 5 years\par \par f/u in 3 months. [facility]. \par pt is encouraged to call or fax the office with any questions or concerns.\par Explained to the pt in full detail with demonstrations how to use the inhalers and inhaler hygiene.\par -Education provided to the PT regarding their visit and conditions.

## 2022-04-08 ENCOUNTER — APPOINTMENT (OUTPATIENT)
Dept: PULMONOLOGY | Facility: CLINIC | Age: 57
End: 2022-04-08
Payer: COMMERCIAL

## 2022-04-08 VITALS — HEIGHT: 70 IN | BODY MASS INDEX: 27.77 KG/M2 | WEIGHT: 194 LBS

## 2022-04-08 DIAGNOSIS — J33.9 NASAL POLYP, UNSPECIFIED: ICD-10-CM

## 2022-04-08 PROCEDURE — 99214 OFFICE O/P EST MOD 30 MIN: CPT | Mod: 95

## 2022-04-08 RX ORDER — NIRMATRELVIR AND RITONAVIR 300-100 MG
20 X 150 MG & KIT ORAL
Qty: 1 | Refills: 0 | Status: ACTIVE | COMMUNITY
Start: 2022-04-08 | End: 1900-01-01

## 2022-04-08 RX ORDER — ALBUTEROL SULFATE 2.5 MG/3ML
(2.5 MG/3ML) SOLUTION RESPIRATORY (INHALATION)
Qty: 300 | Refills: 1 | Status: ACTIVE | COMMUNITY
Start: 2020-01-27 | End: 1900-01-01

## 2022-04-08 RX ORDER — PREDNISONE 10 MG/1
10 TABLET ORAL
Qty: 50 | Refills: 0 | Status: DISCONTINUED | COMMUNITY
Start: 2021-07-20 | End: 2022-04-08

## 2022-04-08 NOTE — ADDENDUM
[FreeTextEntry1] : Documented by Julien Bullard acting as a scribe for Dr. Arpit Lindquist on (04/08/2022).\par \par All medical record entries made by the Scribe were at my, Dr. Arpit Lindquist's, direction and personally dictated by me on (04/08/2022). I have reviewed the chart and agree that the record accurately reflects my personal performance of the history, physical exam, assessment and plan. I have also personally directed, reviewed, and agree with the discharge instructions.\par

## 2022-04-08 NOTE — HISTORY OF PRESENT ILLNESS
[Home] : at home, [unfilled] , at the time of the visit. [Medical Office: ([facility])___] : at the medical office located in  [Verbal consent obtained from patient] : the patient, [unfilled] [TextBox_4] : Mr. LASHAUN ABERNATHY is a 56 year old male with hx of severe persistent asthma, sarcoidosis, nasal polyposis, allergic rhinitis, coming into the office via video call for a f/u evaluation. His chief complaint is covid 19 infection \par -He notes coughing, sore throat (improving), headache with covid 19 infection, tested positive yesterday (4/7/2022)  however has had sx for the past 3-4 days \par -he is using Qvar regularly, however has not been using albuterol \par -denies any visual issues \par -he was last vaccinated for covid 19 10/2021 \par -no new medications, vitamins, or supplements \par -\par \par - patient denies any headaches, nausea, vomiting, fever, chills, sweats, chest pain, chest pressure, palpitations, coughing, wheezing, fatigue, diarrhea, constipation, dysphagia, myalgias, dizziness, leg swelling, leg pain, itchy eyes, itchy ears, heartburn, reflux or sour taste in the mouth

## 2022-04-08 NOTE — REASON FOR VISIT
[Follow-Up] : a follow-up visit [TextBox_44] : video call- severe persistent asthma, sarcoidosis, nasal polyposis, allergic rhinitis

## 2022-04-08 NOTE — ASSESSMENT
[FreeTextEntry1] : Mr. LASHAUN ABERNATHY is a 56 year old male who has a history of chicken pox, chronic sinus disease, sarcoidoses, Cornelio's syndrome, severe persistent Asthma, GERD, Nasal and sinus Polyps, who now comes in via video call for pulmonary evaluation. - now controlled on Dupixent for over 1 year - now covid 19 infection 4/2022\par \par The patient's SOB is felt to be multifactorial:\par -pulmonary \par    -Asthma ; less likely from sarcoidosis (inactive)  \par - Poor breathing mechanics\par - Overweight/ Out-of Shape\par - Restrictive Dysfunction or Obstructive Dysfunction\par - ?Cardiac Disease \par \par Problem 1a: covid 19 infection\par -proper hydration stressed to the patient \par - recommended Christel-[place] cold & flu \par -start Paxlovid \par -no steroids needed at the current time \par -No need for MAB \par \par Problem 1: Severe Persistent Asthma - now controlled (improved) \par -Candidate of Biological therapy  \par prior IGE levels, had been on Xolair \par - s/p blood work for asthma profile (+), food IgE panel (+), eosinophil level (+), IgE level (+), Vitamin D level (low) \par -continue Bevespi 2 inhalations BID\par -continue Alvesco (160) 2 inhalations BID \par -continue QVar 2 inhalations BID\par -continue Zyflo 600 mg BID\par -continue Albuterol QID via Nebulizer PRN \par -Asthma is believed to be caused by inherited (genetic) and environmental factor, but its exact cause is unknown. Asthma may be triggered by allergens, lung infections, or irritants in the air. 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 Dupixent helps prevent severe asthma attacks (exacerbations) and can improve your breathing. Dupixent may also help reduce the amount of oral corticosteroids you need while preventing severe asthma attacks and improving your breathing. Dupixent is not used to treat sudden breathing problems. Risks and side effect of Dupixent were discussed and reviewed with patient.\par \par \par Problem 1B: elevated IgE (125)\par - Candidate for Xolair \par -Xolair is a recombinant DNA- derived humanized IgG1K monoclonal antibody that selectively binds to human immunoglobulin E (IgE). Xolair is produced by a Chinese hamster ovary cell suspension culture in nutrient medium containing the antibiotic gentamicin. Gentamicin is not detectable in the final product. Xolair is a sterile, white, preservative free, lyophilized powder contained in a single use vial that is reconstituted with sterile water for suspension. Side effects include: wheezing, tightness of the chest, trouble breathing, hives, skin rash, feeling anxious or light-headed, fainting, warmth or tingling under skin, or swelling of face, lips, or tongue \par \par Problem 2: Sarcoidosis\par -currently inactive  \par -at this moment, no need for any therapy \par -Sarcoidosis is a medical mystery and can present with very serious illness or little consequence. The disease can affect any organ including skin, liver, lymph glands, spleen, eyes, nervous system, musculoskeletal system, heart, brain, kidneys, and including the lungs. Pulmonary sarcoidosis can cause loss of lung volume and abnormal lung stiffness. This disease is characterized by presence of granulomas, small areas of inflamed cells. Sarcoidosis patients should have regular cardiac and eye examinations as well as regular PFTs. \par \par Problem 2A: Low Vit D\par - On Vit D supplements \par Has been associated with asthma exacerbations and increased allergic symptoms. The goal based on recent information is maintaining levels between 50-70 and low normal is 30. Recommended 50,000 units every two weeks to once a month depending on the level.\par \par \par Problem 3:GERD \par - Things to avoid including overeating, spicy foods, tight clothing, eating within three hours of bed, this list is not all inclusive.\par \par - For treatment of reflux, possible options discussed including diet control, H2 blockers, PPIs, as well as coating motility agents discussed as treatment options. Timing of meals and proximity of last meal to sleep were discussed. If symptoms persist, a formal gastrointestinal evaluation is needed. \par \par Problem 4A: Vertigo\par -s/p Prednisone 20mg for 7 days then 10mg for 7 days- 7/2021\par -Information sheet given to the patient to be reviewed, this medication is never to be used without consulting the prescribing physician. Proper dietary restraint is necessary specifically salt containing foods, if any reaction may occur should be reported. \par \par Problem 4:Chronic Sinus Disease/ Nasal Polyps -improved \par -Recommended the use of Navage Rinsing system. Pulmicort .5  in the Navage \par -Dupixent 200 q2wh\par \par Problem 5: (-)NAIDA- negative \par -s/p home sleep study - 7/2020 \par - Due to his / her EDS, fatigue, snoring, elevated Mallampati class, poor memory, poor concentration, and neck size, he / she is being recommended for a home sleep study. \par - Sleep apnea is associated with adverse clinical consequences which an affect most organ systems. Cardiovascular disease risk includes arrhythmias, atrial fibrillation, hypertension, coronary artery disease, and stroke. Metabolic disorders include diabetes type 2, non-alcoholic fatty liver disease. Mood disorder especially depression; and cognitive decline especially in the elderly. Associations with chronic reflux/Akhtar’s esophagus some but not all inclusive. \par -Reasons include arousal consistent with hypopnea; respiratory events most prominent in REM sleep or supine position; therefore sleep staging and body position are important for accurate diagnosis and estimation of AHI. \par \par Problem 6: Overweight (improved) \par - Weight loss, exercise, and diet control were discussed and are highly encouraged. Treatment options were given such as, aqua therapy, and contacting a nutritionist. Recommended to use the elliptical, stationary bike, less use of treadmill. Mindful eating was explained to the patient Obesity is associated with worsening asthma, shortness of breath, and potential for cardiac disease, diabetes, and other underlying medical conditions.\par \par Problem 7: Poor Breathing Mechanics\par - Proper breathing techniques were reviewed with an emphasis of exhalation. Patient instructed to breath in for 1 second and out for four seconds. Patient was encouraged to not talk while walking.\par \par Problem 8: Health Maintenance/COVID19 Precautions:\par -s/p Covid 19 Pfizer Vaccine x3\par -discussed and recommended Covid 19 precaution, vaccine, and booster at length. \par - Clean your hands often. Wash your hands often with soap and water for at least 20 seconds, especially after blowing your nose, coughing, or sneezing, or having been in a public place.\par - If soap and water are not available, use a hand  that contains at least 60% alcohol.\par - To the extent possible, avoid touching high-touch surfaces in public places - elevator buttons, door handles, handrails, handshaking with people, etc. Use a tissue or your sleeve to cover your hand or finger if you must touch something.\par - Wash your hands after touching surfaces in public places.\par - Avoid touching your face, nose, eyes, etc.\par - Clean and disinfect your home to remove germs: practice routine cleaning of frequently touched surfaces (for example: tables, doorknobs, light switches, handles, desks, toilets, faucets, sinks & cell phones)\par - Avoid crowds, especially in poorly ventilated spaces. Your risk of exposure to respiratory viruses like COVID-19 may increase in crowded, closed-in settings with little air circulation if\par there are people in the crowd who are sick. All patients are recommended to practice social distancing and stay at least 6 feet away from others.\par - Avoid all non-essential travel including plane trips, and especially avoid embarking on cruise ships.\par -If COVID-19 is spreading in your community, take extra measures to put distance between yourself and other people to further reduce your risk of being exposed to this new virus.\par -Stay home as much as possible.\par - Consider ways of getting food brought to your house through family, social, or commercial networks\par -Be aware that the virus has been known to live in the air up to 3 hours post exposure, cardboard up to 24 hours post exposure, copper up to 4 hours post exposure, steel and plastic up to 2-3 days post exposure. Risk of transmission from these surfaces are affected by many variables.\par \par Immune Support Recommendations:\par -OTC Vitamin C 500mg BID \par -OTC Quercetin 250-500mg BID \par -OTC Zinc 75-100mg per day \par -OTC Melatonin 1or 2mg a night \par -OTC Vitamin D 1-4000mg per day\par -Tonic Water 8oz\par -Recommended to Stay Hydrated (At least a gallon/day)\par \par Asthma and COVID19:\par You need to make sure your asthma is under control. This often requires the use of inhaled corticosteroids (and sometimes oral corticosteroids). Inhaled corticosteroids do not likely reduce your immune system’s ability to fight infections, but oral corticosteroids may. It is important to use the steps above to protect yourself to limit your exposure to any respiratory virus. \par \par Problem 9: Health maintenance\par -s/p flu shot-2021\par -recommended strep pneumonia vaccines: Prevnar-13 vaccine, followed by Pneumo vaccine 23 one year following\par -recommended early intervention for URIs\par -recommended regular osteoporosis evaluations\par -recommended early dermatological evaluations\par -recommended after the age of 50 to the age of 70, colonoscopy every 5 years\par \par f/u in 3 months. [facility]. \par pt is encouraged to call or fax the office with any questions or concerns.\par Explained to the pt in full detail with demonstrations how to use the inhalers and inhaler hygiene.\par -Education provided to the PT regarding their visit and conditions.

## 2022-04-13 ENCOUNTER — APPOINTMENT (OUTPATIENT)
Dept: PULMONOLOGY | Facility: CLINIC | Age: 57
End: 2022-04-13

## 2022-06-01 ENCOUNTER — RX RENEWAL (OUTPATIENT)
Age: 57
End: 2022-06-01

## 2022-06-08 ENCOUNTER — APPOINTMENT (OUTPATIENT)
Dept: PULMONOLOGY | Facility: CLINIC | Age: 57
End: 2022-06-08
Payer: COMMERCIAL

## 2022-06-08 ENCOUNTER — RX RENEWAL (OUTPATIENT)
Age: 57
End: 2022-06-08

## 2022-06-08 VITALS
WEIGHT: 195 LBS | HEIGHT: 70 IN | TEMPERATURE: 97.7 F | RESPIRATION RATE: 16 BRPM | DIASTOLIC BLOOD PRESSURE: 80 MMHG | HEART RATE: 86 BPM | SYSTOLIC BLOOD PRESSURE: 130 MMHG | BODY MASS INDEX: 27.92 KG/M2 | OXYGEN SATURATION: 95 %

## 2022-06-08 PROCEDURE — 99214 OFFICE O/P EST MOD 30 MIN: CPT | Mod: 25

## 2022-06-08 PROCEDURE — 94727 GAS DIL/WSHOT DETER LNG VOL: CPT

## 2022-06-08 PROCEDURE — 94010 BREATHING CAPACITY TEST: CPT

## 2022-06-08 PROCEDURE — 95012 NITRIC OXIDE EXP GAS DETER: CPT

## 2022-06-08 PROCEDURE — 99072 ADDL SUPL MATRL&STAF TM PHE: CPT

## 2022-06-08 PROCEDURE — 94729 DIFFUSING CAPACITY: CPT

## 2022-06-08 NOTE — ADDENDUM
[FreeTextEntry1] : Documented by Diogenes Vu acting as a scribe for Dr. Arpit Lindquist on (06/08/2022).\par \par All medical record record entries made by the Scribe were at my, Dr. Arpit Lindquist's, direction and personally dictated by me on (06/08/2022). I have reviewed the chart and agree that the record accurately reflects my personal performance of the history, physical exam, assessment and plan. I have personally directed, reviewed, and agree with the discharge instructions.

## 2022-06-08 NOTE — ASSESSMENT
[FreeTextEntry1] : Mr. LASHAUN ABERNATHY is a 56 year old male who has a history of chicken pox, chronic sinus disease, sarcoidoses, Cornelio's syndrome, severe persistent Asthma, GERD, Nasal and sinus Polyps, who now comes in for pulmonary evaluation. - now controlled on Dupixent for over 1 year - s/p covid 19 infection 4/2022- now resolved \par \par The patient's SOB is felt to be multifactorial:\par -pulmonary \par    -Asthma ; less likely from sarcoidosis (inactive)  \par - Poor breathing mechanics\par - Overweight/ Out-of Shape\par - Restrictive Dysfunction or Obstructive Dysfunction\par - ?Cardiac Disease \par \par Problem 1a: covid 19 infection (resolved 4/2022)\par -proper hydration stressed to the patient \par - recommended [place] cold & flu \par -s/p Paxlovid \par -no steroids needed at the current time \par -No need for MAB \par \par Problem 1: Severe Persistent Asthma - now controlled (improved) \par -Candidate of Biological therapy  \par prior IGE levels, had been on Xolair \par - s/p blood work for asthma profile (+), food IgE panel (+), eosinophil level (+), IgE level (+), Vitamin D level (low) \par -continue Bevespi 2 inhalations BID\par -continue Alvesco (160) 2 inhalations BID \par -continue QVar 2 inhalations BID\par -continue Zyflo 600 mg BID\par -continue Albuterol QID via Nebulizer PRN \par -Asthma is believed to be caused by inherited (genetic) and environmental factor, but its exact cause is unknown. Asthma may be triggered by allergens, lung infections, or irritants in the air. 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 Dupixent helps prevent severe asthma attacks (exacerbations) and can improve your breathing. Dupixent may also help reduce the amount of oral corticosteroids you need while preventing severe asthma attacks and improving your breathing. Dupixent is not used to treat sudden breathing problems. Risks and side effect of Dupixent were discussed and reviewed with patient.\par \par \par Problem 1B: elevated IgE (125)\par - Candidate for Xolair \par -Xolair is a recombinant DNA- derived humanized IgG1K monoclonal antibody that selectively binds to human immunoglobulin E (IgE). Xolair is produced by a Chinese hamster ovary cell suspension culture in nutrient medium containing the antibiotic gentamicin. Gentamicin is not detectable in the final product. Xolair is a sterile, white, preservative free, lyophilized powder contained in a single use vial that is reconstituted with sterile water for suspension. Side effects include: wheezing, tightness of the chest, trouble breathing, hives, skin rash, feeling anxious or light-headed, fainting, warmth or tingling under skin, or swelling of face, lips, or tongue \par \par Problem 2: Sarcoidosis\par -currently inactive  \par -at this moment, no need for any therapy \par -Sarcoidosis is a medical mystery and can present with very serious illness or little consequence. The disease can affect any organ including skin, liver, lymph glands, spleen, eyes, nervous system, musculoskeletal system, heart, brain, kidneys, and including the lungs. Pulmonary sarcoidosis can cause loss of lung volume and abnormal lung stiffness. This disease is characterized by presence of granulomas, small areas of inflamed cells. Sarcoidosis patients should have regular cardiac and eye examinations as well as regular PFTs. \par \par Problem 2A: Low Vit D\par - On Vit D supplements \par Has been associated with asthma exacerbations and increased allergic symptoms. The goal based on recent information is maintaining levels between 50-70 and low normal is 30. Recommended 50,000 units every two weeks to once a month depending on the level.\par \par \par Problem 3:GERD \par - Things to avoid including overeating, spicy foods, tight clothing, eating within three hours of bed, this list is not all inclusive.\par \par - For treatment of reflux, possible options discussed including diet control, H2 blockers, PPIs, as well as coating motility agents discussed as treatment options. Timing of meals and proximity of last meal to sleep were discussed. If symptoms persist, a formal gastrointestinal evaluation is needed. \par \par Problem 4A: Vertigo\par -s/p Prednisone 20mg for 7 days then 10mg for 7 days- 7/2021\par -Information sheet given to the patient to be reviewed, this medication is never to be used without consulting the prescribing physician. Proper dietary restraint is necessary specifically salt containing foods, if any reaction may occur should be reported. \par \par Problem 4:Chronic Sinus Disease/ Nasal Polyps -improved \par -Recommended the use of Navage Rinsing system. Pulmicort .5  in the Navage \par -Dupixent 200 q2wh\par \par Problem 5: (-)NAIDA- negative \par -s/p home sleep study - 7/2020 \par - Due to his / her EDS, fatigue, snoring, elevated Mallampati class, poor memory, poor concentration, and neck size, he / she is being recommended for a home sleep study. \par - Sleep apnea is associated with adverse clinical consequences which an affect most organ systems. Cardiovascular disease risk includes arrhythmias, atrial fibrillation, hypertension, coronary artery disease, and stroke. Metabolic disorders include diabetes type 2, non-alcoholic fatty liver disease. Mood disorder especially depression; and cognitive decline especially in the elderly. Associations with chronic reflux/Akhtar’s esophagus some but not all inclusive. \par -Reasons include arousal consistent with hypopnea; respiratory events most prominent in REM sleep or supine position; therefore sleep staging and body position are important for accurate diagnosis and estimation of AHI. \par \par Problem 6: Overweight (improved) \par - [place] 10 day detox \par - Weight loss, exercise, and diet control were discussed and are highly encouraged. Treatment options were given such as, aqua therapy, and contacting a nutritionist. Recommended to use the elliptical, stationary bike, less use of treadmill. Mindful eating was explained to the patient Obesity is associated with worsening asthma, shortness of breath, and potential for cardiac disease, diabetes, and other underlying medical conditions.\par \par Problem 7: Poor Breathing Mechanics\par - Recommended Wim Hof and Buteyko techniques \par - Proper breathing techniques were reviewed with an emphasis of exhalation. Patient instructed to breath in for 1 second and out for four seconds. Patient was encouraged to not talk while walking.\par \par Problem 8: Health Maintenance/COVID19 Precautions:\par -s/p Covid 19 Pfizer Vaccine x3\par - COVID-19 booster discussed at length with patient. And recommended to wait for the updated booster \par -discussed and recommended Covid 19 precaution, vaccine, and booster at length. \par - Clean your hands often. Wash your hands often with soap and water for at least 20 seconds, especially after blowing your nose, coughing, or sneezing, or having been in a public place.\par - If soap and water are not available, use a hand  that contains at least 60% alcohol.\par - To the extent possible, avoid touching high-touch surfaces in public places - elevator buttons, door handles, handrails, handshaking with people, etc. Use a tissue or your sleeve to cover your hand or finger if you must touch something.\par - Wash your hands after touching surfaces in public places.\par - Avoid touching your face, nose, eyes, etc.\par - Clean and disinfect your home to remove germs: practice routine cleaning of frequently touched surfaces (for example: tables, doorknobs, light switches, handles, desks, toilets, faucets, sinks & cell phones)\par - Avoid crowds, especially in poorly ventilated spaces. Your risk of exposure to respiratory viruses like COVID-19 may increase in crowded, closed-in settings with little air circulation if\par there are people in the crowd who are sick. All patients are recommended to practice social distancing and stay at least 6 feet away from others.\par - Avoid all non-essential travel including plane trips, and especially avoid embarking on cruise ships.\par -If COVID-19 is spreading in your community, take extra measures to put distance between yourself and other people to further reduce your risk of being exposed to this new virus.\par -Stay home as much as possible.\par - Consider ways of getting food brought to your house through family, social, or commercial networks\par -Be aware that the virus has been known to live in the air up to 3 hours post exposure, cardboard up to 24 hours post exposure, copper up to 4 hours post exposure, steel and plastic up to 2-3 days post exposure. Risk of transmission from these surfaces are affected by many variables.\par \par Immune Support Recommendations:\par -OTC Vitamin C 500mg BID \par -OTC Quercetin 250-500mg BID \par -OTC Zinc 75-100mg per day \par -OTC Melatonin 1or 2mg a night \par -OTC Vitamin D 1-4000mg per day\par -Tonic Water 8oz\par -Recommended to Stay Hydrated (At least a gallon/day)\par \par Asthma and COVID19:\par You need to make sure your asthma is under control. This often requires the use of inhaled corticosteroids (and sometimes oral corticosteroids). Inhaled corticosteroids do not likely reduce your immune system’s ability to fight infections, but oral corticosteroids may. It is important to use the steps above to protect yourself to limit your exposure to any respiratory virus. \par \par Problem 9: Health maintenance\par -s/p flu shot-2021\par -recommended strep pneumonia vaccines: Prevnar-13 vaccine, followed by Pneumo vaccine 23 one year following\par -recommended early intervention for URIs\par -recommended regular osteoporosis evaluations\par -recommended early dermatological evaluations\par -recommended after the age of 50 to the age of 70, colonoscopy every 5 years\par \par f/u in 3 months. [facility]. \par pt is encouraged to call or fax the office with any questions or concerns.\par Explained to the pt in full detail with demonstrations how to use the inhalers and inhaler hygiene.\par -Education provided to the PT regarding their visit and conditions.

## 2022-06-08 NOTE — PROCEDURE
[FreeTextEntry1] : Full PFT- spi reveals mild obstruction; FEV1 was 3.35L which is 89% of predicted; normal lung volumes; normal diffusion at 33.4, which is 129% of predicted; normal flow volume loop \par \par FENO was 21; a normal value being less than 25\par Fractional exhaled nitric oxide (FENO) is regarded as a simple, noninvasive method for assessing eosinophilic airway inflammation. Produced by a variety of cells within the lung, nitric oxide (NO) concentrations are generally low in healthy individuals. However, high concentrations of NO appear to be involved in nonspecific host defense mechanisms and chronic inflammatory diseases such as asthma. The American Thor[employer] Society (ATS) therefore has recommended using FENO to aid in the diagnosis and monitoring of eosinophilic airway inflammation and asthma, and for identifying steroid responsive individuals whose chronic respiratory symptoms may be caused by airway inflammation.

## 2022-06-08 NOTE — HISTORY OF PRESENT ILLNESS
[TextBox_4] : Mr. LASHAUN ABERNATHY is a 56 year old male with hx of severe persistent asthma, sarcoidosis, nasal polyposis, allergic rhinitis, coming into the office for a f/u evaluation. His chief complaint is s/p covid 19 infection \par - he notes feeling well in general\par - he notes exercising \par - he notes trying to lose more weight\par - he denies any hoarseness\par - he notes energy level is 8-9/10\par - he denies any residual from COVID\par - he notes sleeping good \par - he notes getting enough sleep\par - he denies anymore vertigo \par - he denies taking any new medications, vitamins, or supplements \par \par - He denies any headaches, nausea, vomiting, fever, chills, sweats, chest pain, chest pressure, palpitations, SOB, coughing, wheezing, fatigue, diarrhea, constipation, dysphagia, myalgias, dizziness, leg swelling, leg pain, itchy eyes, itchy ears, heartburn, reflux, or sour taste in the mouth

## 2022-10-12 ENCOUNTER — APPOINTMENT (OUTPATIENT)
Dept: PULMONOLOGY | Facility: CLINIC | Age: 57
End: 2022-10-12

## 2022-10-12 VITALS
BODY MASS INDEX: 28.2 KG/M2 | DIASTOLIC BLOOD PRESSURE: 72 MMHG | SYSTOLIC BLOOD PRESSURE: 120 MMHG | TEMPERATURE: 97.9 F | HEART RATE: 74 BPM | OXYGEN SATURATION: 97 % | RESPIRATION RATE: 16 BRPM | WEIGHT: 197 LBS | HEIGHT: 70 IN

## 2022-10-12 DIAGNOSIS — U07.1 COVID-19: ICD-10-CM

## 2022-10-12 PROCEDURE — 99072 ADDL SUPL MATRL&STAF TM PHE: CPT

## 2022-10-12 PROCEDURE — 99214 OFFICE O/P EST MOD 30 MIN: CPT

## 2022-10-12 RX ORDER — AMOXICILLIN AND CLAVULANATE POTASSIUM 875; 125 MG/1; MG/1
875-125 TABLET, COATED ORAL
Qty: 28 | Refills: 0 | Status: ACTIVE | COMMUNITY
Start: 2022-10-12 | End: 1900-01-01

## 2022-10-12 NOTE — ASSESSMENT
[FreeTextEntry1] : Mr. LASHAUN ABERNATHY is a 57 year old male who has a history of chicken pox, chronic sinus disease, sarcoidoses, Cornelio's syndrome, severe persistent Asthma, GERD, Nasal and sinus Polyps, who now comes in for pulmonary evaluation. - now controlled on Dupixent for over 1 year - s/p covid 19 infection 4/2022- now acute sinusitis\par \par The patient's SOB is felt to be multifactorial:\par -pulmonary \par    -Asthma ; less likely from sarcoidosis (inactive)  \par - Poor breathing mechanics\par - Overweight/ Out-of Shape\par - Restrictive Dysfunction or Obstructive Dysfunction\par - ?Cardiac Disease \par \par Problem 1a: covid 19 infection (resolved 4/2022)\par -proper hydration stressed to the patient \par - recommended Christel[place] cold & flu \par -s/p Paxlovid \par -no steroids needed at the current time \par -No need for MAB \par \par Problem 1: Severe Persistent Asthma - now controlled (improved) \par -Candidate of Biological therapy  \par prior IGE levels, had been on Xolair \par - s/p blood work for asthma profile (+), food IgE panel (+), eosinophil level (+), IgE level (+), Vitamin D level (low) \par -continue Bevespi 2 inhalations BID\par -continue Alvesco (160) 2 inhalations BID \par -continue QVar 2 inhalations BID\par -continue Zyflo 600 mg BID\par -continue Albuterol QID via Nebulizer PRN \par -Asthma is believed to be caused by inherited (genetic) and environmental factor, but its exact cause is unknown. Asthma may be triggered by allergens, lung infections, or irritants in the air. 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 Dupixent helps prevent severe asthma attacks (exacerbations) and can improve your breathing. Dupixent may also help reduce the amount of oral corticosteroids you need while preventing severe asthma attacks and improving your breathing. Dupixent is not used to treat sudden breathing problems. Risks and side effect of Dupixent were discussed and reviewed with patient.\par \par \par Problem 1B: elevated IgE (125)\par - Candidate for Xolair \par -Xolair is a recombinant DNA- derived humanized IgG1K monoclonal antibody that selectively binds to human immunoglobulin E (IgE). Xolair is produced by a Chinese hamster ovary cell suspension culture in nutrient medium containing the antibiotic gentamicin. Gentamicin is not detectable in the final product. Xolair is a sterile, white, preservative free, lyophilized powder contained in a single use vial that is reconstituted with sterile water for suspension. Side effects include: wheezing, tightness of the chest, trouble breathing, hives, skin rash, feeling anxious or light-headed, fainting, warmth or tingling under skin, or swelling of face, lips, or tongue \par \par Problem 1C: Acute sinusitis 10/2022\par -add Augmentin 875 mg BID \par -add Christel [place] Cold & Sinus \par -Patient has a clinical scenario most c/w acute sinusitis the etiology of which is unknown (bacterial/viral/fungal). Hydration, steaming, intranasal saline is needed. \par \par Problem 2: Sarcoidosis\par -currently inactive  \par -at this moment, no need for any therapy \par -Sarcoidosis is a medical mystery and can present with very serious illness or little consequence. The disease can affect any organ including skin, liver, lymph glands, spleen, eyes, nervous system, musculoskeletal system, heart, brain, kidneys, and including the lungs. Pulmonary sarcoidosis can cause loss of lung volume and abnormal lung stiffness. This disease is characterized by presence of granulomas, small areas of inflamed cells. Sarcoidosis patients should have regular cardiac and eye examinations as well as regular PFTs. \par \par Problem 2A: Low Vit D\par - On Vit D supplements \par Has been associated with asthma exacerbations and increased allergic symptoms. The goal based on recent information is maintaining levels between 50-70 and low normal is 30. Recommended 50,000 units every two weeks to once a month depending on the level.\par \par \par Problem 3:GERD \par - Things to avoid including overeating, spicy foods, tight clothing, eating within three hours of bed, this list is not all inclusive.\par \par - For treatment of reflux, possible options discussed including diet control, H2 blockers, PPIs, as well as coating motility agents discussed as treatment options. Timing of meals and proximity of last meal to sleep were discussed. If symptoms persist, a formal gastrointestinal evaluation is needed. \par \par Problem 4A: Vertigo\par -s/p Prednisone 20mg for 7 days then 10mg for 7 days- 7/2021\par -Information sheet given to the patient to be reviewed, this medication is never to be used without consulting the prescribing physician. Proper dietary restraint is necessary specifically salt containing foods, if any reaction may occur should be reported. \par \par Problem 4:Chronic Sinus Disease/ Nasal Polyps -improved \par -Recommended the use of Navage Rinsing system. Pulmicort .5  in the Navage \par -Dupixent 200 q2wh\par \par Problem 5: (-)NAIDA- negative \par -s/p home sleep study - 7/2020 \par - Due to his / her EDS, fatigue, snoring, elevated Mallampati class, poor memory, poor concentration, and neck size, he / she is being recommended for a home sleep study. \par - Sleep apnea is associated with adverse clinical consequences which an affect most organ systems. Cardiovascular disease risk includes arrhythmias, atrial fibrillation, hypertension, coronary artery disease, and stroke. Metabolic disorders include diabetes type 2, non-alcoholic fatty liver disease. Mood disorder especially depression; and cognitive decline especially in the elderly. Associations with chronic reflux/Akhtar’s esophagus some but not all inclusive. \par -Reasons include arousal consistent with hypopnea; respiratory events most prominent in REM sleep or supine position; therefore sleep staging and body position are important for accurate diagnosis and estimation of AHI. \par \par Problem 6: Overweight (improved) \par - [place] 10 day detox \par - Weight loss, exercise, and diet control were discussed and are highly encouraged. Treatment options were given such as, aqua therapy, and contacting a nutritionist. Recommended to use the elliptical, stationary bike, less use of treadmill. Mindful eating was explained to the patient Obesity is associated with worsening asthma, shortness of breath, and potential for cardiac disease, diabetes, and other underlying medical conditions.\par \par Problem 7: Poor Breathing Mechanics\par - Recommended Wim Hof and Buteyko techniques \par - Proper breathing techniques were reviewed with an emphasis of exhalation. Patient instructed to breath in for 1 second and out for four seconds. Patient was encouraged to not talk while walking.\par \par Problem 8: Health Maintenance/COVID19 Precautions:\par -s/p Covid 19 Pfizer Vaccine x3\par - COVID-19 booster discussed at length with patient. And recommended to wait for the updated booster \par -discussed and recommended Covid 19 precaution, vaccine, and booster at length. \par - Clean your hands often. Wash your hands often with soap and water for at least 20 seconds, especially after blowing your nose, coughing, or sneezing, or having been in a public place.\par - If soap and water are not available, use a hand  that contains at least 60% alcohol.\par - To the extent possible, avoid touching high-touch surfaces in public places - elevator buttons, door handles, handrails, handshaking with people, etc. Use a tissue or your sleeve to cover your hand or finger if you must touch something.\par - Wash your hands after touching surfaces in public places.\par - Avoid touching your face, nose, eyes, etc.\par - Clean and disinfect your home to remove germs: practice routine cleaning of frequently touched surfaces (for example: tables, doorknobs, light switches, handles, desks, toilets, faucets, sinks & cell phones)\par - Avoid crowds, especially in poorly ventilated spaces. Your risk of exposure to respiratory viruses like COVID-19 may increase in crowded, closed-in settings with little air circulation if\par there are people in the crowd who are sick. All patients are recommended to practice social distancing and stay at least 6 feet away from others.\par - Avoid all non-essential travel including plane trips, and especially avoid embarking on cruise ships.\par -If COVID-19 is spreading in your community, take extra measures to put distance between yourself and other people to further reduce your risk of being exposed to this new virus.\par -Stay home as much as possible.\par - Consider ways of getting food brought to your house through family, social, or commercial networks\par -Be aware that the virus has been known to live in the air up to 3 hours post exposure, cardboard up to 24 hours post exposure, copper up to 4 hours post exposure, steel and plastic up to 2-3 days post exposure. Risk of transmission from these surfaces are affected by many variables.\par \par Immune Support Recommendations:\par -OTC Vitamin C 500mg BID \par -OTC Quercetin 250-500mg BID \par -OTC Zinc 75-100mg per day \par -OTC Melatonin 1or 2mg a night \par -OTC Vitamin D 1-4000mg per day\par -Tonic Water 8oz\par -Recommended to Stay Hydrated (At least a gallon/day)\par \par Asthma and COVID19:\par You need to make sure your asthma is under control. This often requires the use of inhaled corticosteroids (and sometimes oral corticosteroids). Inhaled corticosteroids do not likely reduce your immune system’s ability to fight infections, but oral corticosteroids may. It is important to use the steps above to protect yourself to limit your exposure to any respiratory virus. \par \par Problem 9: Health maintenance\par -s/p flu shot-2021\par -recommended strep pneumonia vaccines: Prevnar-13 vaccine, followed by Pneumo vaccine 23 one year following\par -recommended early intervention for URIs\par -recommended regular osteoporosis evaluations\par -recommended early dermatological evaluations\par -recommended after the age of 50 to the age of 70, colonoscopy every 5 years\par \par f/u in 3 months. [facility]. \par pt is encouraged to call or fax the office with any questions or concerns.\par Explained to the pt in full detail with demonstrations how to use the inhalers and inhaler hygiene.\par -Education provided to the PT regarding their visit and conditions.

## 2022-10-12 NOTE — ADDENDUM
[FreeTextEntry1] : Documented by DEEP Ryan acting as a scribe for Dr. Arpit Lindquist on 10/12/2022 .\par All medical record entries made by the Scribe were at my, Dr. Arpit Lindquist's, direction and personally dictated by me on 10/12/2022. I have reviewed the chart and agree that the record accurately reflects my personal performance of the history, physical exam, assessment and plan. I have also personally directed, reviewed, and agree with the discharge instructions.

## 2022-10-12 NOTE — PHYSICAL EXAM
[No Acute Distress] : no acute distress [Well Nourished] : well nourished [Well Groomed] : well groomed [No Deformities] : no deformities [Well Developed] : well developed [Normal Oropharynx] : normal oropharynx [III] : Mallampati Class: III [Normal Appearance] : normal appearance [No Neck Mass] : no neck mass [Normal Rate/Rhythm] : normal rate/rhythm [Normal S1, S2] : normal s1, s2 [No Murmurs] : no murmurs [No Resp Distress] : no resp distress [Clear to Auscultation Bilaterally] : clear to auscultation bilaterally [No Abnormalities] : no abnormalities [Benign] : benign [Normal Gait] : normal gait [No Clubbing] : no clubbing [No Cyanosis] : no cyanosis [No Edema] : no edema [FROM] : FROM [Normal Color/ Pigmentation] : normal color/ pigmentation [No Focal Deficits] : no focal deficits [Oriented x3] : oriented x3 [Normal Affect] : normal affect [TextBox_68] : I:E 1:3, clear

## 2022-10-12 NOTE — HISTORY OF PRESENT ILLNESS
[TextBox_4] : Mr. LASHAUN ABERNATHY is a 57 year old male with hx of severe persistent asthma, sarcoidosis, nasal polyposis, allergic rhinitis, coming into the office for a f/u evaluation. His chief complaint is \par \par -he notes currently cici with sinus infection\par -he notes Sx onset last Friday\par -he notes sore throat\par -he notes weight is stable \par -he notes bowels are good and regular \par -he notes appetite is stable\par -he notes exercising (elliptical 5-6x a week)\par -he notes tolerating Dupixent very well\par \par -he denies any headaches, nausea, vomiting, fever, chills, sweats, chest pain, chest pressure, coughing, wheezing, palpitations, constipation, diarrhea, dizziness, dysphagia, heartburn, reflux, itchy eyes, itchy ears, leg swelling, leg pain, arthralgias, myalgias, or sour taste in the mouth.

## 2023-04-01 ENCOUNTER — NON-APPOINTMENT (OUTPATIENT)
Age: 58
End: 2023-04-01

## 2023-06-12 ENCOUNTER — RX RENEWAL (OUTPATIENT)
Age: 58
End: 2023-06-12

## 2023-06-15 ENCOUNTER — RX RENEWAL (OUTPATIENT)
Age: 58
End: 2023-06-15

## 2023-07-26 ENCOUNTER — APPOINTMENT (OUTPATIENT)
Dept: PULMONOLOGY | Facility: CLINIC | Age: 58
End: 2023-07-26
Payer: COMMERCIAL

## 2023-07-26 VITALS
HEART RATE: 65 BPM | DIASTOLIC BLOOD PRESSURE: 80 MMHG | BODY MASS INDEX: 28.92 KG/M2 | TEMPERATURE: 97.6 F | SYSTOLIC BLOOD PRESSURE: 130 MMHG | RESPIRATION RATE: 16 BRPM | WEIGHT: 202 LBS | HEIGHT: 70 IN | OXYGEN SATURATION: 98 %

## 2023-07-26 DIAGNOSIS — R79.89 OTHER SPECIFIED ABNORMAL FINDINGS OF BLOOD CHEMISTRY: ICD-10-CM

## 2023-07-26 PROCEDURE — 95012 NITRIC OXIDE EXP GAS DETER: CPT

## 2023-07-26 PROCEDURE — ZZZZZ: CPT

## 2023-07-26 PROCEDURE — 99214 OFFICE O/P EST MOD 30 MIN: CPT | Mod: 25

## 2023-07-26 PROCEDURE — 94727 GAS DIL/WSHOT DETER LNG VOL: CPT

## 2023-07-26 PROCEDURE — 94010 BREATHING CAPACITY TEST: CPT

## 2023-07-26 PROCEDURE — 99204 OFFICE O/P NEW MOD 45 MIN: CPT | Mod: 25

## 2023-07-26 PROCEDURE — 94729 DIFFUSING CAPACITY: CPT

## 2023-07-26 NOTE — HISTORY OF PRESENT ILLNESS
[TextBox_4] : Mr. LASHAUN ABERNATHY is a 57 year old male with hx of severe persistent asthma, sarcoidosis, nasal polyposis, allergic rhinitis, coming into the office for a f/u evaluation. His chief complaint is \par \par -he notes he works out 3-4 days a week now \par -he notes he is getting back into it \par -he notes vision is stable \par -he notes no palpitations \par -he notes his sinuses are stable \par -he notes no reflux \par -he notes feeling generally well \par \par \par -he denies any headaches, nausea, emesis, fever, chills, sweats, chest pain, chest pressure, coughing, wheezing, palpitations, constipation, diarrhea, vertigo, dysphagia, heartburn, reflux, itchy eyes, itchy ears, leg swelling, arthralgias, myalgias, or sour taste in the mouth.

## 2023-07-26 NOTE — ADDENDUM
[FreeTextEntry1] : Documented by Jose Eduardo Willett acting as a scribe for Dr. Arpit Lindquist on 07/26/2023 .\par \par All medical record entries made by the Scribe were at my, Dr. Arpit Lindquist's, direction and personally dictated by me on 07/26/2023. I have reviewed the chart and agree that the record accurately reflects my personal performance of the history, physical exam, assessment and plan. I have also personally directed, reviewed, and agree with the discharge instructions.

## 2023-07-26 NOTE — PROCEDURE
[FreeTextEntry1] : Full PFT reveals normal flows; FEV1 was 3.72  L which is 100% of predicted; normal lung volumes; normal diffusion at 44.4 , which is 169 % of predicted; normal flow volume loop.\par PFTs were performed to evaluate for Asthma \par \par FENO was ;25 a normal value being less than 25\par Fractional exhaled nitric oxide (FENO) is regarded as a simple, noninvasive method for assessing eosinophilic airway inflammation. Produced by a variety of cells within the lung, nitric oxide (NO) concentrations are generally low in healthy individuals. However, high concentrations of NO appear to be involved in nonspecific host defense mechanisms and chronic inflammatory diseases such as asthma. The American Thor[employer] Society (ATS) therefore has recommended using FENO to aid in the diagnosis and monitoring of eosinophilic airway inflammation and asthma, and for identifying steroid responsive individuals whose chronic respiratory symptoms may be caused by airway inflammation.

## 2023-07-26 NOTE — ASSESSMENT
[FreeTextEntry1] : Mr. LASHAUN ABERNATHY is a 57 year old male who has a history of chicken pox, chronic sinus disease, sarcoidoses, Cornelio's syndrome, severe persistent Asthma, GERD, Nasal and sinus Polyps, who now comes in for pulmonary evaluation. - now controlled on Dupixent for over 1 year - s/p covid 19 infection 4/2022 - Stable \par \par The patient's SOB is felt to be multifactorial:\par -pulmonary \par    -Asthma ; less likely from sarcoidosis (inactive)  \par - Poor breathing mechanics\par - Overweight/ Out-of Shape\par - Restrictive Dysfunction or Obstructive Dysfunction\par - ?Cardiac Disease \par \par Problem 1a: covid 19 infection (resolved 4/2022)\par -proper hydration stressed to the patient \par - recommended Christel-[place] cold & flu \par -s/p Paxlovid \par -no steroids needed at the current time \par -No need for MAB \par \par Problem 1: Severe Persistent Asthma - now controlled (improved) \par -Candidate of Biological therapy  \par prior IGE levels, had been on Xolair \par - s/p blood work for asthma profile (+), food IgE panel (+), eosinophil level (+), IgE level (+), Vitamin D level (low) \par -continue Bevespi 2 inhalations BID\par -continue Alvesco (160) 2 inhalations BID \par -continue QVar 2 inhalations BID\par -continue Zyflo 600 mg BID\par -continue Albuterol QID via Nebulizer PRN \par -Asthma is believed to be caused by inherited (genetic) and environmental factor, but its exact cause is unknown. Asthma may be triggered by allergens, lung infections, or irritants in the air. 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 Dupixent helps prevent severe asthma attacks (exacerbations) and can improve your breathing. Dupixent may also help reduce the amount of oral corticosteroids you need while preventing severe asthma attacks and improving your breathing. Dupixent is not used to treat sudden breathing problems. Risks and side effect of Dupixent were discussed and reviewed with patient.\par \par \par Problem 1B: elevated IgE (125)\par - Candidate for Xolair \par -Xolair is a recombinant DNA- derived humanized IgG1K monoclonal antibody that selectively binds to human immunoglobulin E (IgE). Xolair is produced by a Chinese hamster ovary cell suspension culture in nutrient medium containing the antibiotic gentamicin. Gentamicin is not detectable in the final product. Xolair is a sterile, white, preservative free, lyophilized powder contained in a single use vial that is reconstituted with sterile water for suspension. Side effects include: wheezing, tightness of the chest, trouble breathing, hives, skin rash, feeling anxious or light-headed, fainting, warmth or tingling under skin, or swelling of face, lips, or tongue \par \par Problem 2: Sarcoidosis\par -currently inactive  \par -at this moment, no need for any therapy \par -Sarcoidosis is a medical mystery and can present with very serious illness or little consequence. The disease can affect any organ including skin, liver, lymph glands, spleen, eyes, nervous system, musculoskeletal system, heart, brain, kidneys, and including the lungs. Pulmonary sarcoidosis can cause loss of lung volume and abnormal lung stiffness. This disease is characterized by presence of granulomas, small areas of inflamed cells. Sarcoidosis patients should have regular cardiac and eye examinations as well as regular PFTs. \par \par Problem 2A: Low Vit D\par - On Vit D supplements \par Has been associated with asthma exacerbations and increased allergic symptoms. The goal based on recent information is maintaining levels between 50-70 and low normal is 30. Recommended 50,000 units every two weeks to once a month depending on the level.\par \par Problem 3:GERD (quiet)\par - Things to avoid including overeating, spicy foods, tight clothing, eating within three hours of bed, this list is not all inclusive.\par \par - For treatment of reflux, possible options discussed including diet control, H2 blockers, PPIs, as well as coating motility agents discussed as treatment options. Timing of meals and proximity of last meal to sleep were discussed. If symptoms persist, a formal gastrointestinal evaluation is needed. \par \par Problem 4A: Vertigo\par -s/p Prednisone 20mg for 7 days then 10mg for 7 days- 7/2021\par -Information sheet given to the patient to be reviewed, this medication is never to be used without consulting the prescribing physician. Proper dietary restraint is necessary specifically salt containing foods, if any reaction may occur should be reported. \par \par Problem 4:Chronic Sinus Disease/ Nasal Polyps -improved \par -Recommended the use of Navage Rinsing system. Pulmicort .5  in the Navage \par -Dupixent 200 q2wh\par \par Problem 5: (-)NAIDA- negative \par -s/p home sleep study - 7/2020 \par - Due to his / her EDS, fatigue, snoring, elevated Mallampati class, poor memory, poor concentration, and neck size, he / she is being recommended for a home sleep study. \par - Sleep apnea is associated with adverse clinical consequences which an affect most organ systems. Cardiovascular disease risk includes arrhythmias, atrial fibrillation, hypertension, coronary artery disease, and stroke. Metabolic disorders include diabetes type 2, non-alcoholic fatty liver disease. Mood disorder especially depression; and cognitive decline especially in the elderly. Associations with chronic reflux/Akhtar’s esophagus some but not all inclusive. \par -Reasons include arousal consistent with hypopnea; respiratory events most prominent in REM sleep or supine position; therefore sleep staging and body position are important for accurate diagnosis and estimation of AHI. \par \par Problem 6: Overweight (improved) \par - Pelon Gutiérrez 10 day detox \par - Weight loss, exercise, and diet control were discussed and are highly encouraged. Treatment options were given such as, aqua therapy, and contacting a nutritionist. Recommended to use the elliptical, stationary bike, less use of treadmill. Mindful eating was explained to the patient Obesity is associated with worsening asthma, shortness of breath, and potential for cardiac disease, diabetes, and other underlying medical conditions.\par \par Problem 7: Poor Breathing Mechanics\par - Recommended Wim Hof and Buteyko techniques \par - Proper breathing techniques were reviewed with an emphasis of exhalation. Patient instructed to breath in for 1 second and out for four seconds. Patient was encouraged to not talk while walking.\par \par Problem 8: Health Maintenance/COVID19 Precautions:\par -s/p Covid 19 Pfizer Vaccine x3\par - COVID-19 booster discussed at length with patient. And recommended to wait for the updated booster \par -discussed and recommended Covid 19 precaution, vaccine, and booster at length. \par - Clean your hands often. Wash your hands often with soap and water for at least 20 seconds, especially after blowing your nose, coughing, or sneezing, or having been in a public place.\par - If soap and water are not available, use a hand  that contains at least 60% alcohol.\par - To the extent possible, avoid touching high-touch surfaces in public places - elevator buttons, door handles, handrails, handshaking with people, etc. Use a tissue or your sleeve to cover your hand or finger if you must touch something.\par - Wash your hands after touching surfaces in public places.\par - Avoid touching your face, nose, eyes, etc.\par - Clean and disinfect your home to remove germs: practice routine cleaning of frequently touched surfaces (for example: tables, doorknobs, light switches, handles, desks, toilets, faucets, sinks & cell phones)\par - Avoid crowds, especially in poorly ventilated spaces. Your risk of exposure to respiratory viruses like COVID-19 may increase in crowded, closed-in settings with little air circulation if\par there are people in the crowd who are sick. All patients are recommended to practice social distancing and stay at least 6 feet away from others.\par - Avoid all non-essential travel including plane trips, and especially avoid embarking on cruise ships.\par -If COVID-19 is spreading in your community, take extra measures to put distance between yourself and other people to further reduce your risk of being exposed to this new virus.\par -Stay home as much as possible.\par - Consider ways of getting food brought to your house through family, social, or commercial networks\par -Be aware that the virus has been known to live in the air up to 3 hours post exposure, cardboard up to 24 hours post exposure, copper up to 4 hours post exposure, steel and plastic up to 2-3 days post exposure. Risk of transmission from these surfaces are affected by many variables.\par \par Immune Support Recommendations:\par -OTC Vitamin C 500mg BID \par -OTC Quercetin 250-500mg BID \par -OTC Zinc 75-100mg per day \par -OTC Melatonin 1or 2mg a night \par -OTC Vitamin D 1-4000mg per day\par -Tonic Water 8oz\par -Recommended to Stay Hydrated (At least a gallon/day)\par \par Asthma and COVID19:\par You need to make sure your asthma is under control. This often requires the use of inhaled corticosteroids (and sometimes oral corticosteroids). Inhaled corticosteroids do not likely reduce your immune system’s ability to fight infections, but oral corticosteroids may. It is important to use the steps above to protect yourself to limit your exposure to any respiratory virus. \par \par Problem 9: Health maintenance\par -s/p flu shot-2021\par -recommended strep pneumonia vaccines: Prevnar-13 vaccine, followed by Pneumo vaccine 23 one year following\par -recommended early intervention for URIs\par -recommended regular osteoporosis evaluations\par -recommended early dermatological evaluations\par -recommended after the age of 50 to the age of 70, colonoscopy every 5 years\par \par f/u in 3 months. [facility]. \par pt is encouraged to call or fax the office with any questions or concerns.\par Explained to the pt in full detail with demonstrations how to use the inhalers and inhaler hygiene.\par -Education provided to the PT regarding their visit and conditions.

## 2023-09-27 RX ORDER — DUPILUMAB 300 MG/2ML
300 INJECTION, SOLUTION SUBCUTANEOUS
Qty: 8 | Refills: 1 | Status: DISCONTINUED | COMMUNITY
Start: 2020-03-17 | End: 2023-09-27

## 2023-12-29 RX ORDER — CICLESONIDE 160 UG/1
160 AEROSOL, METERED RESPIRATORY (INHALATION) TWICE DAILY
Qty: 1 | Refills: 0 | Status: ACTIVE | COMMUNITY
Start: 2023-12-29 | End: 1900-01-01

## 2023-12-29 RX ORDER — BECLOMETHASONE DIPROPIONATE HFA 80 UG/1
80 AEROSOL, METERED RESPIRATORY (INHALATION)
Qty: 1 | Refills: 0 | Status: DISCONTINUED | COMMUNITY
Start: 2020-03-10 | End: 2023-12-29

## 2023-12-29 RX ORDER — NIRMATRELVIR AND RITONAVIR 300-100 MG
20 X 150 MG & KIT ORAL
Qty: 1 | Refills: 0 | Status: ACTIVE | COMMUNITY
Start: 2023-12-29 | End: 1900-01-01

## 2023-12-29 RX ORDER — GLYCOPYRROLATE AND FORMOTEROL FUMARATE 9; 4.8 UG/1; UG/1
9-4.8 AEROSOL, METERED RESPIRATORY (INHALATION)
Qty: 1 | Refills: 0 | Status: ACTIVE | COMMUNITY
Start: 2020-01-27 | End: 1900-01-01

## 2024-01-10 ENCOUNTER — APPOINTMENT (OUTPATIENT)
Dept: PULMONOLOGY | Facility: CLINIC | Age: 59
End: 2024-01-10
Payer: COMMERCIAL

## 2024-01-10 VITALS
HEART RATE: 64 BPM | TEMPERATURE: 97.5 F | HEIGHT: 70 IN | SYSTOLIC BLOOD PRESSURE: 124 MMHG | OXYGEN SATURATION: 98 % | DIASTOLIC BLOOD PRESSURE: 80 MMHG | BODY MASS INDEX: 29.35 KG/M2 | RESPIRATION RATE: 16 BRPM | WEIGHT: 205 LBS

## 2024-01-10 DIAGNOSIS — K21.9 GASTRO-ESOPHAGEAL REFLUX DISEASE W/OUT ESOPHAGITIS: ICD-10-CM

## 2024-01-10 DIAGNOSIS — R06.83 SNORING: ICD-10-CM

## 2024-01-10 DIAGNOSIS — R76.8 OTHER SPECIFIED ABNORMAL IMMUNOLOGICAL FINDINGS IN SERUM: ICD-10-CM

## 2024-01-10 DIAGNOSIS — J82.83 EOSINOPHILIC ASTHMA: ICD-10-CM

## 2024-01-10 DIAGNOSIS — R09.82 POSTNASAL DRIP: ICD-10-CM

## 2024-01-10 DIAGNOSIS — D86.9 SARCOIDOSIS, UNSPECIFIED: ICD-10-CM

## 2024-01-10 DIAGNOSIS — R06.02 SHORTNESS OF BREATH: ICD-10-CM

## 2024-01-10 DIAGNOSIS — J45.50 SEVERE PERSISTENT ASTHMA, UNCOMPLICATED: ICD-10-CM

## 2024-01-10 PROCEDURE — 94729 DIFFUSING CAPACITY: CPT

## 2024-01-10 PROCEDURE — 95012 NITRIC OXIDE EXP GAS DETER: CPT

## 2024-01-10 PROCEDURE — 94010 BREATHING CAPACITY TEST: CPT

## 2024-01-10 PROCEDURE — 94727 GAS DIL/WSHOT DETER LNG VOL: CPT

## 2024-01-10 PROCEDURE — ZZZZZ: CPT

## 2024-01-10 PROCEDURE — 99214 OFFICE O/P EST MOD 30 MIN: CPT | Mod: 25

## 2024-01-10 NOTE — ASSESSMENT
[FreeTextEntry1] : Mr. LASHAUN ABERNATHY is a 57 year old male who has a history of chicken pox, chronic sinus disease, sarcoidosis, Cornelio's syndrome, severe persistent Asthma, GERD, Nasal and sinus Polyps, who now comes in for pulmonary evaluation. - now controlled on Dupixent for over 1 year - s/p covid 19 infection 4/2022, 12/2023 - resolved  The patient's SOB is felt to be multifactorial: -pulmonary  -Asthma ; less likely from sarcoidosis (inactive) - Poor breathing mechanics - Overweight/ Out-of Shape - Restrictive Dysfunction or Obstructive Dysfunction - ?Cardiac Disease  Problem 1: Severe Persistent Asthma - now controlled (improved) -Candidate of Biological therapy prior IGE levels, had been on Xolair - s/p blood work for asthma profile (+), food IgE panel (+), eosinophil level (+), IgE level (+), Vitamin D level (low) -continue Bevespi 2 inhalations BID -continue Alvesco (160) 2 inhalations BID -continue QVar 2 inhalations BID -continue Zyflo 600 mg BID -continue Albuterol QID via Nebulizer PRN -Asthma is believed to be caused by inherited (genetic) and environmental factor, but its exact cause is unknown. Asthma may be triggered by allergens, lung infections, or irritants in the air. Asthma triggers are different for each person  Problem 1A: Eosinophilic Asthma - stable - Fasenra, Nucala, Dupixent Candidate - but on Dupixent x over 1 year Dupixent is a prescription medicine used with other asthma medicines for the maintenance treatment of moderate-to-severe asthma in people aged 12 years and older whose asthma is not controlled with their current asthma medicines. Dupixent helps prevent severe asthma attacks (exacerbations) and can improve your breathing. Dupixent may also help reduce the amount of oral corticosteroids you need while preventing severe asthma attacks and improving your breathing. Dupixent is not used to treat sudden breathing problems. Risks and side effect of Dupixent were discussed and reviewed with patient.  Problem 1B: elevated IgE (125) - Candidate for Xolair -Xolair is a recombinant DNA- derived humanized IgG1K monoclonal antibody that selectively binds to human immunoglobulin E (IgE). Xolair is produced by a Chinese hamster ovary cell suspension culture in nutrient medium containing the antibiotic gentamicin. Gentamicin is not detectable in the final product. Xolair is a sterile, white, preservative free, lyophilized powder contained in a single use vial that is reconstituted with sterile water for suspension. Side effects include: wheezing, tightness of the chest, trouble breathing, hives, skin rash, feeling anxious or light-headed, fainting, warmth or tingling under skin, or swelling of face, lips, or tongue  Problem 1C: covid 19 infection (resolved 4/2022, 12/2023 (Paxlovid)) -proper hydration stressed to the patient - recommended Christ[place] cold & flu -s/p Paxlovid -no steroids needed at the current time -No need for MAB  Problem 2: Sarcoidosis -currently inactive -at this moment, no need for any therapy -Sarcoidosis is a medical mystery and can present with very serious illness or little consequence. The disease can affect any organ including skin, liver, lymph glands, spleen, eyes, nervous system, musculoskeletal system, heart, brain, kidneys, and including the lungs. Pulmonary sarcoidosis can cause loss of lung volume and abnormal lung stiffness. This disease is characterized by presence of granulomas, small areas of inflamed cells. Sarcoidosis patients should have regular cardiac and eye examinations as well as regular PFTs.  Problem 2A: Low Vit D - On Vit D supplements Has been associated with asthma exacerbations and increased allergic symptoms. The goal based on recent information is maintaining levels between 50-70 and low normal is 30. Recommended 50,000 units every two weeks to once a month depending on the level.  Problem 3:GERD (quiet) - Things to avoid including overeating, spicy foods, tight clothing, eating within three hours of bed, this list is not all inclusive. - For treatment of reflux, possible options discussed including diet control, H2 blockers, PPIs, as well as coating motility agents discussed as treatment options. Timing of meals and proximity of last meal to sleep were discussed. If symptoms persist, a formal gastrointestinal evaluation is needed.  Problem 4A: Vertigo -s/p Prednisone 20mg for 7 days then 10mg for 7 days- 7/2021 -Information sheet given to the patient to be reviewed, this medication is never to be used without consulting the prescribing physician. Proper dietary restraint is necessary specifically salt containing foods, if any reaction may occur should be reported.  Problem 4:Chronic Sinus Disease/ Nasal Polyps -improved -Recommended the use of Navage Rinsing system. Pulmicort.5 in the Navage -Dupixent 200 q2wh  Problem 5: (-)NAIDA- negative -s/p home sleep study - 7/2020 - Due to his / her EDS, fatigue, snoring, elevated Mallampati class, poor memory, poor concentration, and neck size, he / she is being recommended for a home sleep study. - Sleep apnea is associated with adverse clinical consequences which an affect most organ systems. Cardiovascular disease risk includes arrhythmias, atrial fibrillation, hypertension, coronary artery disease, and stroke. Metabolic disorders include diabetes type 2, non-alcoholic fatty liver disease. Mood disorder especially depression; and cognitive decline especially in the elderly. Associations with chronic reflux/Akhtar's esophagus some but not all inclusive. -Reasons include arousal consistent with hypopnea; respiratory events most prominent in REM sleep or supine position; therefore sleep staging and body position are important for accurate diagnosis and estimation of AHI.  Problem 6: Overweight (improved) -recommended Berberine OTC - Pelon Brock 10 day detox - Weight loss, exercise, and diet control were discussed and are highly encouraged. Treatment options were given such as, aqua therapy, and contacting a nutritionist. Recommended to use the elliptical, stationary bike, less use of treadmill. Mindful eating was explained to the patient Obesity is associated with worsening asthma, shortness of breath, and potential for cardiac disease, diabetes, and other underlying medical conditions.  Problem 7: Poor Breathing Mechanics - Recommended Tristen Cooperf and [place] techniques - Proper breathing techniques were reviewed with an emphasis of exhalation. Patient instructed to breath in for 1 second and out for four seconds. Patient was encouraged to not talk while walking.  Problem 8: Health Maintenance/COVID19 Precautions: -s/p Covid 19 Pfizer Vaccine x3 - COVID-19 booster discussed at length with patient. And recommended to wait for the updated booster -discussed and recommended Covid 19 precaution, vaccine, and booster at length. - Clean your hands often. Wash your hands often with soap and water for at least 20 seconds, especially after blowing your nose, coughing, or sneezing, or having been in a public place.  If soap and water are not available, use a hand  that contains at least 60% alcohol. - To the extent possible, avoid touching high-touch surfaces in public places - elevator buttons, door handles, handrails, handshaking with people, etc. Use a tissue or your sleeve to cover your hand or finger if you must touch something. - Wash your hands after touching surfaces in public places. - Avoid touching your face, nose, eyes, etc. - Clean and disinfect your home to remove germs: practice routine cleaning of frequently touched surfaces (for example: tables, doorknobs, light switches, handles, desks, toilets, faucets, sinks & cell phones) - Avoid crowds, especially in poorly ventilated spaces. Your risk of exposure to respiratory viruses like COVID-19 may increase in crowded, closed-in settings with little air circulation if there are people in the crowd who are sick. All patients are recommended to practice social distancing and stay at least 6 feet away from others. - Avoid all non-essential travel including plane trips, and especially avoid embarking on cruise ships. -If COVID-19 is spreading in your community, take extra measures to put distance between yourself and other people to further reduce your risk of being exposed to this new virus. -Stay home as much as possible. - Consider ways of getting food brought to your house through family, social, or commercial networks -Be aware that the virus has been known to live in the air up to 3 hours post exposure, cardboard up to 24 hours post exposure, copper up to 4 hours post exposure, steel and plastic up to 2-3 days post exposure. Risk of transmission from these surfaces are affected by many variables.  Immune Support Recommendations: -OTC Vitamin C 500mg BID -OTC Quercetin 250-500mg BID -OTC Zinc 75-100mg per day -OTC Melatonin 1or 2mg a night -OTC Vitamin D 1-4000mg per day -Tonic Water 8oz -Recommended to Stay Hydrated (At least a gallon/day)  Asthma and COVID19:  You need to make sure your asthma is under control. This often requires the use of inhaled corticosteroids (and sometimes oral corticosteroids). Inhaled corticosteroids do not likely reduce your immune system's ability to fight infections, but oral corticosteroids may. It is important to use the steps above to protect yourself to limit your exposure to any respiratory virus.  Problem 9: Health maintenance -s/p flu shot-2021 -recommended strep pneumonia vaccines: Prevnar-13 vaccine, followed by Pneumo vaccine 23 one year following -recommended early intervention for URIs -recommended regular osteoporosis evaluations -recommended early dermatological evaluations -recommended after the age of 50 to the age of 70, colonoscopy every 5 years  f/u in 3 months. [facility]. pt is encouraged to call or fax the office with any questions or concerns. Explained to the pt in full detail with demonstrations how to use the inhalers and inhaler hygiene. -Education provided to the PT regarding their visit and conditions.

## 2024-01-10 NOTE — HISTORY OF PRESENT ILLNESS
[TextBox_4] : Mr. LASHAUN ABERNATHY is a 58 year old male with hx of severe persistent asthma, sarcoidosis, nasal polyposis, allergic rhinitis, coming into the office for a f/u evaluation. His chief complaint is   - he notes having had Covid-19 12/2023 which he got when traveling for skiing - he notes having some shoulder pain for which he got a cortisone shot - he notes having had trouble sleeping due to his shoulder pain   - he notes taking vitamin D  - he notes his bout of Covid was rather pedestrian  -he denies any headaches, nausea, emesis, fever, chills, sweats, chest pain, chest pressure, coughing, wheezing, palpitations, constipation, diarrhea, vertigo, dysphagia, heartburn, reflux, itchy eyes, itchy ears, leg swelling, leg pain, arthralgias, myalgias, or sour taste in the mouth.

## 2024-01-10 NOTE — ADDENDUM
[FreeTextEntry1] : Documented by Rocky Caicedo acting as a scribe for Dr. Arpit Lindquist on 01/10/2024.   All medical record entries made by the Scribe were at my, Dr. Arpit Lindquist's, direction and personally dictated by me on 01/10/2024. I have reviewed the chart and agree that the record accurately reflects my personal performance of the history, physical exam, assessment and plan. I have also personally directed, reviewed, and agree with the discharge instructions.

## 2024-01-10 NOTE — PROCEDURE
[FreeTextEntry1] : Full PFT reveals normal flows; FEV1 was 3.43 L which is 93% of predicted; normal lung volumes; normal diffusion at 30.3, which is 116% of predicted; Abnormal inspiratory limb. PFTs were performed to evaluate for SOB  FENO was 34; a normal value being less than 25 Fractional exhaled nitric oxide (FENO) is regarded as a simple, noninvasive method for assessing eosinophilic airway inflammation. Produced by a variety of cells within the lung, nitric oxide (NO) concentrations are generally low in healthy individuals. However, high concentrations of NO appear to be involved in nonspecific host defense mechanisms and chronic inflammatory diseases such as asthma. The American Thor[employer] Society (ATS) therefore has strongly recommended using FENO to aid in the assessment, management, and long-term monitoring of eosinophilic airway inflammation and asthma, and for identifying steroid responsive individuals whose chronic respiratory symptoms may be caused by airway inflammation. In their 2011 clinical practice guideline, the ATS emphasizes the importance of using FENO.

## 2024-03-25 ENCOUNTER — RX RENEWAL (OUTPATIENT)
Age: 59
End: 2024-03-25

## 2024-04-09 RX ORDER — DUPILUMAB 300 MG/2ML
300 INJECTION, SOLUTION SUBCUTANEOUS
Qty: 4 | Refills: 3 | Status: ACTIVE | COMMUNITY
Start: 2023-09-27 | End: 1900-01-01

## 2024-04-12 ENCOUNTER — NON-APPOINTMENT (OUTPATIENT)
Age: 59
End: 2024-04-12

## 2024-04-12 ENCOUNTER — OUTPATIENT (OUTPATIENT)
Dept: OUTPATIENT SERVICES | Facility: HOSPITAL | Age: 59
LOS: 1 days | End: 2024-04-12

## 2024-04-12 ENCOUNTER — APPOINTMENT (OUTPATIENT)
Dept: INTERNAL MEDICINE | Facility: CLINIC | Age: 59
End: 2024-04-12

## 2024-07-17 ENCOUNTER — APPOINTMENT (OUTPATIENT)
Dept: PULMONOLOGY | Facility: CLINIC | Age: 59
End: 2024-07-17
Payer: COMMERCIAL

## 2024-07-17 VITALS
TEMPERATURE: 97 F | HEIGHT: 70 IN | OXYGEN SATURATION: 98 % | SYSTOLIC BLOOD PRESSURE: 118 MMHG | HEART RATE: 66 BPM | WEIGHT: 205 LBS | DIASTOLIC BLOOD PRESSURE: 82 MMHG | BODY MASS INDEX: 29.35 KG/M2 | RESPIRATION RATE: 16 BRPM

## 2024-07-17 DIAGNOSIS — J82.83 EOSINOPHILIC ASTHMA: ICD-10-CM

## 2024-07-17 DIAGNOSIS — R06.02 SHORTNESS OF BREATH: ICD-10-CM

## 2024-07-17 DIAGNOSIS — J45.50 SEVERE PERSISTENT ASTHMA, UNCOMPLICATED: ICD-10-CM

## 2024-07-17 DIAGNOSIS — R79.89 OTHER SPECIFIED ABNORMAL FINDINGS OF BLOOD CHEMISTRY: ICD-10-CM

## 2024-07-17 DIAGNOSIS — R09.82 POSTNASAL DRIP: ICD-10-CM

## 2024-07-17 DIAGNOSIS — R76.8 OTHER SPECIFIED ABNORMAL IMMUNOLOGICAL FINDINGS IN SERUM: ICD-10-CM

## 2024-07-17 DIAGNOSIS — K21.9 GASTRO-ESOPHAGEAL REFLUX DISEASE W/OUT ESOPHAGITIS: ICD-10-CM

## 2024-07-17 DIAGNOSIS — R06.83 SNORING: ICD-10-CM

## 2024-07-17 DIAGNOSIS — D86.9 SARCOIDOSIS, UNSPECIFIED: ICD-10-CM

## 2024-07-17 PROCEDURE — 99214 OFFICE O/P EST MOD 30 MIN: CPT | Mod: 25

## 2024-07-17 PROCEDURE — 90715 TDAP VACCINE 7 YRS/> IM: CPT

## 2024-07-17 PROCEDURE — 95012 NITRIC OXIDE EXP GAS DETER: CPT

## 2024-07-17 PROCEDURE — 94010 BREATHING CAPACITY TEST: CPT

## 2024-07-17 PROCEDURE — 90471 IMMUNIZATION ADMIN: CPT

## 2025-01-15 ENCOUNTER — APPOINTMENT (OUTPATIENT)
Dept: PULMONOLOGY | Facility: CLINIC | Age: 60
End: 2025-01-15
Payer: COMMERCIAL

## 2025-01-15 VITALS
RESPIRATION RATE: 16 BRPM | OXYGEN SATURATION: 98 % | HEART RATE: 84 BPM | WEIGHT: 211 LBS | BODY MASS INDEX: 30.21 KG/M2 | TEMPERATURE: 98.3 F | SYSTOLIC BLOOD PRESSURE: 124 MMHG | HEIGHT: 70 IN | DIASTOLIC BLOOD PRESSURE: 78 MMHG

## 2025-01-15 DIAGNOSIS — J45.50 SEVERE PERSISTENT ASTHMA, UNCOMPLICATED: ICD-10-CM

## 2025-01-15 DIAGNOSIS — D86.9 SARCOIDOSIS, UNSPECIFIED: ICD-10-CM

## 2025-01-15 DIAGNOSIS — R79.89 OTHER SPECIFIED ABNORMAL FINDINGS OF BLOOD CHEMISTRY: ICD-10-CM

## 2025-01-15 DIAGNOSIS — R06.02 SHORTNESS OF BREATH: ICD-10-CM

## 2025-01-15 DIAGNOSIS — J33.9 NASAL POLYP, UNSPECIFIED: ICD-10-CM

## 2025-01-15 DIAGNOSIS — K21.9 GASTRO-ESOPHAGEAL REFLUX DISEASE W/OUT ESOPHAGITIS: ICD-10-CM

## 2025-01-15 DIAGNOSIS — R76.8 OTHER SPECIFIED ABNORMAL IMMUNOLOGICAL FINDINGS IN SERUM: ICD-10-CM

## 2025-01-15 DIAGNOSIS — J82.83 EOSINOPHILIC ASTHMA: ICD-10-CM

## 2025-01-15 PROCEDURE — ZZZZZ: CPT

## 2025-01-15 PROCEDURE — 94729 DIFFUSING CAPACITY: CPT

## 2025-01-15 PROCEDURE — 95012 NITRIC OXIDE EXP GAS DETER: CPT

## 2025-01-15 PROCEDURE — 94727 GAS DIL/WSHOT DETER LNG VOL: CPT

## 2025-01-15 PROCEDURE — 99214 OFFICE O/P EST MOD 30 MIN: CPT | Mod: 25

## 2025-01-15 PROCEDURE — 94010 BREATHING CAPACITY TEST: CPT

## 2025-06-06 ENCOUNTER — RX RENEWAL (OUTPATIENT)
Age: 60
End: 2025-06-06

## 2025-06-06 RX ORDER — DUPILUMAB 300 MG/2ML
300 INJECTION, SOLUTION SUBCUTANEOUS
Qty: 6 | Refills: 0 | Status: ACTIVE | COMMUNITY
Start: 2025-06-06 | End: 1900-01-01

## 2025-07-08 ENCOUNTER — RX RENEWAL (OUTPATIENT)
Age: 60
End: 2025-07-08

## 2025-07-23 ENCOUNTER — APPOINTMENT (OUTPATIENT)
Dept: PULMONOLOGY | Facility: CLINIC | Age: 60
End: 2025-07-23
Payer: COMMERCIAL

## 2025-07-23 VITALS
WEIGHT: 200 LBS | HEART RATE: 66 BPM | OXYGEN SATURATION: 95 % | SYSTOLIC BLOOD PRESSURE: 110 MMHG | HEIGHT: 70 IN | TEMPERATURE: 97.2 F | RESPIRATION RATE: 16 BRPM | BODY MASS INDEX: 28.63 KG/M2 | DIASTOLIC BLOOD PRESSURE: 82 MMHG

## 2025-07-23 DIAGNOSIS — R79.89 OTHER SPECIFIED ABNORMAL FINDINGS OF BLOOD CHEMISTRY: ICD-10-CM

## 2025-07-23 DIAGNOSIS — J45.50 SEVERE PERSISTENT ASTHMA, UNCOMPLICATED: ICD-10-CM

## 2025-07-23 DIAGNOSIS — R76.8 OTHER SPECIFIED ABNORMAL IMMUNOLOGICAL FINDINGS IN SERUM: ICD-10-CM

## 2025-07-23 DIAGNOSIS — D86.9 SARCOIDOSIS, UNSPECIFIED: ICD-10-CM

## 2025-07-23 DIAGNOSIS — R06.83 SNORING: ICD-10-CM

## 2025-07-23 DIAGNOSIS — R06.02 SHORTNESS OF BREATH: ICD-10-CM

## 2025-07-23 DIAGNOSIS — J33.9 NASAL POLYP, UNSPECIFIED: ICD-10-CM

## 2025-07-23 DIAGNOSIS — K21.9 GASTRO-ESOPHAGEAL REFLUX DISEASE W/OUT ESOPHAGITIS: ICD-10-CM

## 2025-07-23 DIAGNOSIS — J82.83 EOSINOPHILIC ASTHMA: ICD-10-CM

## 2025-07-23 PROCEDURE — 94010 BREATHING CAPACITY TEST: CPT

## 2025-07-23 PROCEDURE — ZZZZZ: CPT

## 2025-07-23 PROCEDURE — 95012 NITRIC OXIDE EXP GAS DETER: CPT

## 2025-07-23 PROCEDURE — 71046 X-RAY EXAM CHEST 2 VIEWS: CPT

## 2025-07-23 PROCEDURE — 99214 OFFICE O/P EST MOD 30 MIN: CPT | Mod: 25

## 2025-07-23 PROCEDURE — 94729 DIFFUSING CAPACITY: CPT

## 2025-07-23 PROCEDURE — 94727 GAS DIL/WSHOT DETER LNG VOL: CPT
